# Patient Record
Sex: FEMALE | Race: WHITE | NOT HISPANIC OR LATINO | Employment: FULL TIME | URBAN - METROPOLITAN AREA
[De-identification: names, ages, dates, MRNs, and addresses within clinical notes are randomized per-mention and may not be internally consistent; named-entity substitution may affect disease eponyms.]

---

## 2019-03-27 PROBLEM — I10 ESSENTIAL HYPERTENSION: Status: ACTIVE | Noted: 2019-03-27

## 2019-06-24 ENCOUNTER — TELEPHONE (OUTPATIENT)
Dept: NEUROLOGY | Facility: CLINIC | Age: 59
End: 2019-06-24

## 2019-06-24 ENCOUNTER — DOCUMENTATION ONLY (OUTPATIENT)
Dept: NEUROLOGY | Facility: CLINIC | Age: 59
End: 2019-06-24

## 2019-06-24 NOTE — TELEPHONE ENCOUNTER
----- Message from RT Jet sent at 6/24/2019  9:03 AM CDT -----  Contact: pt    pt , returned missed call, called azalea crooks.

## 2019-08-23 ENCOUNTER — TELEPHONE (OUTPATIENT)
Dept: NEUROLOGY | Facility: CLINIC | Age: 59
End: 2019-08-23

## 2019-08-23 NOTE — TELEPHONE ENCOUNTER
----- Message from Bianca Castanon sent at 8/23/2019 10:59 AM CDT -----  Type: Needs Medical Advice    Who Called:  self  Symptoms (please be specific):  Referral for new patient   How long has patient had these symptoms:  Numbness on right side   Pharmacy name and phone #:     Best Call Back Number: [phone] (home)     Additional Information: no appointments available

## 2019-10-14 PROBLEM — R07.2 PRECORDIAL PAIN: Status: ACTIVE | Noted: 2019-10-14

## 2019-10-23 ENCOUNTER — OFFICE VISIT (OUTPATIENT)
Dept: NEUROLOGY | Facility: CLINIC | Age: 59
End: 2019-10-23
Payer: COMMERCIAL

## 2019-10-23 VITALS
WEIGHT: 213.38 LBS | HEART RATE: 81 BPM | HEIGHT: 65 IN | DIASTOLIC BLOOD PRESSURE: 85 MMHG | BODY MASS INDEX: 35.55 KG/M2 | RESPIRATION RATE: 16 BRPM | SYSTOLIC BLOOD PRESSURE: 135 MMHG

## 2019-10-23 DIAGNOSIS — Z86.69 HISTORY OF MIGRAINE: ICD-10-CM

## 2019-10-23 DIAGNOSIS — M54.6 THORACIC SPINE PAIN: ICD-10-CM

## 2019-10-23 DIAGNOSIS — M54.2 NECK PAIN: ICD-10-CM

## 2019-10-23 DIAGNOSIS — R20.0 RIGHT SIDED NUMBNESS: Primary | ICD-10-CM

## 2019-10-23 PROCEDURE — 3008F PR BODY MASS INDEX (BMI) DOCUMENTED: ICD-10-PCS | Mod: CPTII,S$GLB,, | Performed by: PSYCHIATRY & NEUROLOGY

## 2019-10-23 PROCEDURE — 3079F DIAST BP 80-89 MM HG: CPT | Mod: CPTII,S$GLB,, | Performed by: PSYCHIATRY & NEUROLOGY

## 2019-10-23 PROCEDURE — 99205 PR OFFICE/OUTPT VISIT, NEW, LEVL V, 60-74 MIN: ICD-10-PCS | Mod: S$GLB,,, | Performed by: PSYCHIATRY & NEUROLOGY

## 2019-10-23 PROCEDURE — 3075F PR MOST RECENT SYSTOLIC BLOOD PRESS GE 130-139MM HG: ICD-10-PCS | Mod: CPTII,S$GLB,, | Performed by: PSYCHIATRY & NEUROLOGY

## 2019-10-23 PROCEDURE — 99205 OFFICE O/P NEW HI 60 MIN: CPT | Mod: S$GLB,,, | Performed by: PSYCHIATRY & NEUROLOGY

## 2019-10-23 PROCEDURE — 99999 PR PBB SHADOW E&M-EST. PATIENT-LVL IV: ICD-10-PCS | Mod: PBBFAC,,, | Performed by: PSYCHIATRY & NEUROLOGY

## 2019-10-23 PROCEDURE — 3075F SYST BP GE 130 - 139MM HG: CPT | Mod: CPTII,S$GLB,, | Performed by: PSYCHIATRY & NEUROLOGY

## 2019-10-23 PROCEDURE — 3079F PR MOST RECENT DIASTOLIC BLOOD PRESSURE 80-89 MM HG: ICD-10-PCS | Mod: CPTII,S$GLB,, | Performed by: PSYCHIATRY & NEUROLOGY

## 2019-10-23 PROCEDURE — 99999 PR PBB SHADOW E&M-EST. PATIENT-LVL IV: CPT | Mod: PBBFAC,,, | Performed by: PSYCHIATRY & NEUROLOGY

## 2019-10-23 PROCEDURE — 3008F BODY MASS INDEX DOCD: CPT | Mod: CPTII,S$GLB,, | Performed by: PSYCHIATRY & NEUROLOGY

## 2019-10-23 RX ORDER — LANOLIN ALCOHOL/MO/W.PET/CERES
400 CREAM (GRAM) TOPICAL DAILY
Refills: 0 | COMMUNITY
Start: 2019-10-23 | End: 2023-08-16 | Stop reason: CLARIF

## 2019-10-23 NOTE — PROGRESS NOTES
Date: 10/24/2019    Patient ID: Laura Aquino is a 59 y.o. female.    Referring Provider:  Gabe Nicole*    Chief Complaint: Peripheral Neuropathy      History of Present Illness:  Ms. Aquino is a 59 y.o. female who presents referred by Gabe Nicole* today for evaluation of numbness/paresthesias. Records from [place] reviewed and summarized below.     She had an episode in [place] in March 2019. She woke up lightheaded and with a headache. She had numbness in her lips, right arm numbness, and bilateral UE. She felt her heart was racing and pounding out of her chest. She had MRI, CT, and EKG which were normal. She was better by the next day.     She presented to the [facility] ER on 6/10/2019 for dizziness, palpitations, paresthesias in her face. She does not recall having a headache with this one. Bloodwork and EKG were normal. She refused CT head and left AMA. She saw her PCP who referred her to neurology. It was noted the pattern was not consistent with TIA/stroke but she was started on aspirin. Vitamin levels were checked and B6 was high.     The right face has remained numb since that time but it comes and goes. She sometimes gets right eye vision changes where she sees flurries in her right eye. These are similar to her prior migraine aura but not as intense. This morning she saw a red patch in her right visual field. She has rare migraines. Normally she doesn't get headaches. She will have one once every 3-4 years. She always get an aura beforehand where she couldn't see out of her right visual field.     She hasn't yet seen an optho doctor.     She has numbness in her feet constantly and feels this is separate.     Her A1c was 6.2%. She was started on BP meds and statin. She is on aspirin.     She went to the ER 10/8 and had chest heaviness. She was given nitroglycerin and had relief. She was evaluated by cardiology who did a stress test and found a moderate defect in the anterior wall and  heart cath was performed. She was told the cath was fine. Since the procedure, she has not had any more episodes of chest heaviness.     On Sunday, she woke up with an excruciating pain in her right shoulder blade and was severe. She took ibuprofen and it has gotten better over the course of the past few days. She has some stiffness in the neck as well.     Review of Systems:   14 systems reviewed - All other systems were reviewed and negative except as mentioned in the HPI    Allergies:  Review of patient's allergies indicates:  No Known Allergies    Current Medications:  Current Outpatient Medications   Medication Sig Dispense Refill    amLODIPine (NORVASC) 5 MG tablet Take 1 tablet (5 mg total) by mouth once daily. 90 tablet 4    atorvastatin (LIPITOR) 40 MG tablet Take 40 mg by mouth once daily.      metFORMIN (GLUCOPHAGE-XR) 500 MG 24 hr tablet Take 1 tablet (500 mg total) by mouth daily with breakfast. 90 tablet 3    nitroGLYCERIN (NITROSTAT) 0.4 MG SL tablet Place 1 tablet (0.4 mg total) under the tongue every 5 (five) minutes as needed for Chest pain. 100 tablet 0    aspirin (ECOTRIN) 81 MG EC tablet Take 1 tablet (81 mg total) by mouth once daily. (Patient not taking: Reported on 10/23/2019)  0    magnesium oxide (MAG-OX) 400 mg (241.3 mg magnesium) tablet Take 1 tablet (400 mg total) by mouth once daily.  0     No current facility-administered medications for this visit.        Past Medical History:  Past Medical History:   Diagnosis Date    Abdominal wall mass     Abnormal results of liver function studies     AD (atopic dermatitis)     Diabetes mellitus     Hyperlipidemia     Hypertension     Precordial pain 10/2019    Skin sensation disturbance        Past Surgical History:  Past Surgical History:   Procedure Laterality Date    ANGIOGRAM, CORONARY, WITH LEFT HEART CATHETERIZATION N/A 10/14/2019    Procedure: Angiogram, Coronary, with Left Heart Cath;  Surgeon: Franki Carroll MD;   "Location: [facility] CATH;  Service: Cardiology;  Laterality: N/A;    HYSTERECTOMY  1999    Complete    LEFT HEART CATHETERIZATION Left 10/14/2019    Procedure: Left heart cath;  Surgeon: Franki Carroll MD;  Location: [facility] CATH;  Service: Cardiology;  Laterality: Left;    LIPOMA RESECTION      TOTAL ABDOMINAL HYSTERECTOMY W/ BILATERAL SALPINGOOPHORECTOMY Bilateral 1999    w/ hysterectomy    TUBAL LIGATION         Family History:  family history includes Anxiety disorder in her brother and sister; Asthma in her mother; Diabetes in her father and mother; HIV in her son; No Known Problems in her son and son; Sarcoidosis in her daughter.    Social History:   reports that she quit smoking about 26 years ago. Her smoking use included cigarettes. She has a 5.00 pack-year smoking history. She has never used smokeless tobacco. She reports that she does not drink alcohol or use drugs.    Physical Exam:  Vitals:    10/23/19 1414   BP: 135/85   Pulse: 81   Resp: 16   Weight: 96.8 kg (213 lb 6.5 oz)   Height: 5' 5" (1.651 m)   PainSc: 0-No pain     Body mass index is 35.51 kg/m².  General: Well developed, well nourished.  No acute distress.  Eyes: no ocular hemorrhages  Musculoskeletal: No obvious joint deformities, moves all extremities well.  Peripheral vascular: No edema noted    Neurological Exam:  Mental status: Awake, alert, and oriented to person, place, and time. Recent and remote memory appear to be intact. Attention, concentration, and fund of knowledge regarding recent events normal.   Speech/Language: No dysarthria or aphasia on conversation.   Cranial nerves: Visual fields full. Pupils equal round and reactive to light, extraocular movements intact, facial strength and sensation intact bilaterally, palate and tongue midline, hearing grossly intact bilaterally. Shoulder shrug normal bilaterally.   Motor: 5 out of 5 strength throughout the upper and lower extremities bilaterally. Normal bulk and tone.   Sensation: " Intact to light touch, pinprick, and vibration bilaterally.  DTR: 2+ at the knees and biceps bilaterally.  Coordination: Finger-nose-finger testing and rapid alternating movements normal bilaterally. No tremor.   Gait: Normal gait        Data:  I have personally reviewed the referring provider's notes, labs, & imaging made available to me today.       Labs:  CBC:   Lab Results   Component Value Date    WBC 7.44 10/08/2019    HGB 13.3 10/08/2019    HCT 39.6 10/08/2019     10/08/2019    MCV 84 10/08/2019    RDW 13.6 10/08/2019     BMP:   Lab Results   Component Value Date     10/08/2019    K 4.3 10/08/2019     10/08/2019    CO2 27 10/08/2019    BUN 14 10/08/2019    CREATININE 0.92 10/08/2019    GLU 93 10/08/2019    CALCIUM 9.5 10/08/2019    MG 1.8 06/11/2019     LFTS;   Lab Results   Component Value Date    PROT 7.7 10/08/2019    ALBUMIN 4.3 10/08/2019    BILITOT 0.5 10/08/2019    AST 41 (H) 10/08/2019    ALKPHOS 77 10/08/2019    ALT 26 10/08/2019     COAGS: No results found for: INR, PROTIME, PTT  FLP: No results found for: CHOL, HDL, LDLCALC, TRIG, CHOLHDL      Imaging:  MRI brain and CTA from march 2019 were reportedly normal.   I have personally reviewed CT head from 10/8/2019 and find it to be normal.     Assessment and Plan:  Ms. Aquino is a 59 y.o. female referred to me by Gabe Nicole for evaluation of right sided numbness. We discussed possibilities of small lacunar stroke that didn't show up on initial MRI, complex migraine aura, cervical spine abnormalities, or thoracic spine abnormalities for her right shoulder blade pain. I will obtain MRI brain, MRI c and t spine. I recommended that she continue on aspirin 81 mg daily in the event this was stroke/TIA. We will also start her on magnesium for complex migraine aura prophylaxis.       Right sided numbness  -     MRI Cervical Spine Without Contrast; Future; Expected date: 10/23/2019  -     MRI Brain Without Contrast; Future;  Expected date: 10/23/2019    Thoracic spine pain  -     MRI Cervical Spine Without Contrast; Future; Expected date: 10/23/2019  -     MRI Thoracic Spine Without Contrast; Future; Expected date: 10/23/2019  -     MRI Brain Without Contrast; Future; Expected date: 10/23/2019    History of migraine  -     MRI Cervical Spine Without Contrast; Future; Expected date: 10/23/2019  -     MRI Brain Without Contrast; Future; Expected date: 10/23/2019    Neck pain  -     MRI Cervical Spine Without Contrast; Future; Expected date: 10/23/2019  -     MRI Brain Without Contrast; Future; Expected date: 10/23/2019    Other orders  -     magnesium oxide (MAG-OX) 400 mg (241.3 mg magnesium) tablet; Take 1 tablet (400 mg total) by mouth once daily.; Refill: 0

## 2019-10-23 NOTE — LETTER
October 24, 2019      Gabe Nicole, DO  [address] [place] [address] [id number] [place], [place] [address] [place] [address]  Phone: [phone]  Fax: [phone]          Patient: Laura Aquino   MR Number: [id number]   YOB: 1960   Date of Visit: 10/23/2019       Dear Dr. Gabe Nicole:    Thank you for referring Laura Aquino to me for evaluation. Attached you will find relevant portions of my assessment and plan of care.    If you have questions, please do not hesitate to call me. I look forward to following Laura Aquino along with you.    Sincerely,    Rachele Castanon MD    Enclosure  CC:  No Recipients    If you would like to receive this communication electronically, please contact [email or web address] or [phone] to request more information on [employer] Link access.    For providers and/or their staff who would like to refer a patient to [facility], please contact us through our one-stop-shop provider referral line, [facility], at [phone].    If you feel you have received this communication in error or would no longer like to receive these types of communications, please e-mail [email or web address]

## 2019-11-12 ENCOUNTER — HOSPITAL ENCOUNTER (OUTPATIENT)
Dept: RADIOLOGY | Facility: HOSPITAL | Age: 59
Discharge: HOME | End: 2019-11-12
Attending: PSYCHIATRY & NEUROLOGY
Payer: COMMERCIAL

## 2019-11-12 DIAGNOSIS — M54.2 NECK PAIN: ICD-10-CM

## 2019-11-12 DIAGNOSIS — R20.0 RIGHT SIDED NUMBNESS: ICD-10-CM

## 2019-11-12 DIAGNOSIS — Z86.69 HISTORY OF MIGRAINE: ICD-10-CM

## 2019-11-12 DIAGNOSIS — M54.6 THORACIC SPINE PAIN: ICD-10-CM

## 2019-11-12 PROCEDURE — 72141 MRI NECK SPINE W/O DYE: CPT | Mod: 26,,, | Performed by: RADIOLOGY

## 2019-11-12 PROCEDURE — 72141 MRI CERVICAL SPINE WITHOUT CONTRAST: ICD-10-PCS | Mod: 26,,, | Performed by: RADIOLOGY

## 2019-11-12 PROCEDURE — 72146 MRI CHEST SPINE W/O DYE: CPT | Mod: TC,PO

## 2019-11-12 PROCEDURE — 72146 MRI THORACIC SPINE WITHOUT CONTRAST: ICD-10-PCS | Mod: 26,,, | Performed by: RADIOLOGY

## 2019-11-12 PROCEDURE — 70551 MRI BRAIN WITHOUT CONTRAST: ICD-10-PCS | Mod: 26,,, | Performed by: RADIOLOGY

## 2019-11-12 PROCEDURE — 72146 MRI CHEST SPINE W/O DYE: CPT | Mod: 26,,, | Performed by: RADIOLOGY

## 2019-11-12 PROCEDURE — 70551 MRI BRAIN STEM W/O DYE: CPT | Mod: 26,,, | Performed by: RADIOLOGY

## 2019-11-12 PROCEDURE — 70551 MRI BRAIN STEM W/O DYE: CPT | Mod: TC,PO

## 2019-11-12 PROCEDURE — 72141 MRI NECK SPINE W/O DYE: CPT | Mod: TC,PO

## 2019-11-13 ENCOUNTER — TELEPHONE (OUTPATIENT)
Dept: NEUROLOGY | Facility: CLINIC | Age: 59
End: 2019-11-13

## 2019-11-13 NOTE — TELEPHONE ENCOUNTER
----- Message from Rachele Castanon MD sent at 11/13/2019  8:36 AM CST -----  MRI of the brain showed age related changes and no stroke or cause for the right face and arm numbness. I still could continue on the aspirin and magnesium.

## 2019-11-13 NOTE — TELEPHONE ENCOUNTER
Spoke with pt and informed per Dr. Castanon that the MRI of the brain showed age related changes and no stroke or cause for the right face and arm numbness. Could continue on the aspirin and magnesium.   There is some arthritis in the thoracic spine that could lead to pinching of nerves and the pain in the upper back. This sometimes will improve spontaneously over the course of 6-8 weeks but if it is persistent, we can order physical therapy and send to pain management. Instructed to call and inform if PT pr pain management is necessary.   The MRI spine showed there is some disc bulges and arthritis changes in the neck as well that could lead to neck pain and possible numbness in the arm but this wouldn't explain the numbness in the face. Pt verbalized understanding.

## 2020-09-12 ENCOUNTER — CLINICAL SUPPORT (OUTPATIENT)
Dept: URGENT CARE | Facility: CLINIC | Age: 60
End: 2020-09-12
Payer: COMMERCIAL

## 2020-09-12 DIAGNOSIS — Z01.818 PRE-OP EXAM: ICD-10-CM

## 2020-09-12 PROCEDURE — U0003 INFECTIOUS AGENT DETECTION BY NUCLEIC ACID (DNA OR RNA); SEVERE ACUTE RESPIRATORY SYNDROME CORONAVIRUS 2 (SARS-COV-2) (CORONAVIRUS DISEASE [COVID-19]), AMPLIFIED PROBE TECHNIQUE, MAKING USE OF HIGH THROUGHPUT TECHNOLOGIES AS DESCRIBED BY CMS-2020-01-R: HCPCS

## 2020-09-13 LAB — SARS-COV-2 RNA RESP QL NAA+PROBE: NOT DETECTED

## 2020-09-16 PROBLEM — G47.33 OBSTRUCTIVE SLEEP APNEA: Status: ACTIVE | Noted: 2020-09-16

## 2020-09-16 PROBLEM — R07.0 THROAT PAIN: Status: ACTIVE | Noted: 2020-09-16

## 2020-09-16 PROBLEM — J35.01 CHRONIC TONSILLITIS: Status: ACTIVE | Noted: 2020-09-16

## 2020-09-16 PROBLEM — R06.83 SNORING: Status: ACTIVE | Noted: 2020-09-16

## 2020-10-26 ENCOUNTER — TELEPHONE (OUTPATIENT)
Dept: GASTROENTEROLOGY | Facility: CLINIC | Age: 60
End: 2020-10-26

## 2020-10-26 ENCOUNTER — OFFICE VISIT (OUTPATIENT)
Dept: GASTROENTEROLOGY | Facility: CLINIC | Age: 60
End: 2020-10-26
Payer: COMMERCIAL

## 2020-10-26 VITALS — BODY MASS INDEX: 28.5 KG/M2 | RESPIRATION RATE: 18 BRPM | WEIGHT: 171.06 LBS | HEIGHT: 65 IN

## 2020-10-26 DIAGNOSIS — R09.A2 GLOBUS SENSATION: ICD-10-CM

## 2020-10-26 DIAGNOSIS — Z90.89 S/P TONSILLECTOMY: ICD-10-CM

## 2020-10-26 DIAGNOSIS — Z01.818 PREOP TESTING: ICD-10-CM

## 2020-10-26 DIAGNOSIS — J02.9 SORE THROAT: ICD-10-CM

## 2020-10-26 DIAGNOSIS — R05.9 COUGH: ICD-10-CM

## 2020-10-26 DIAGNOSIS — Z01.812 PRE-PROCEDURE LAB EXAM: ICD-10-CM

## 2020-10-26 DIAGNOSIS — K59.09 CHRONIC CONSTIPATION: ICD-10-CM

## 2020-10-26 DIAGNOSIS — R10.9 RIGHT SIDED ABDOMINAL PAIN: Primary | ICD-10-CM

## 2020-10-26 DIAGNOSIS — R12 WATERBRASH: ICD-10-CM

## 2020-10-26 PROCEDURE — 99999 PR PBB SHADOW E&M-EST. PATIENT-LVL IV: ICD-10-PCS | Mod: PBBFAC,,, | Performed by: NURSE PRACTITIONER

## 2020-10-26 PROCEDURE — 99204 OFFICE O/P NEW MOD 45 MIN: CPT | Mod: S$GLB,,, | Performed by: NURSE PRACTITIONER

## 2020-10-26 PROCEDURE — 99204 PR OFFICE/OUTPT VISIT, NEW, LEVL IV, 45-59 MIN: ICD-10-PCS | Mod: S$GLB,,, | Performed by: NURSE PRACTITIONER

## 2020-10-26 PROCEDURE — 3008F BODY MASS INDEX DOCD: CPT | Mod: CPTII,S$GLB,, | Performed by: NURSE PRACTITIONER

## 2020-10-26 PROCEDURE — 99999 PR PBB SHADOW E&M-EST. PATIENT-LVL IV: CPT | Mod: PBBFAC,,, | Performed by: NURSE PRACTITIONER

## 2020-10-26 PROCEDURE — 3008F PR BODY MASS INDEX (BMI) DOCUMENTED: ICD-10-PCS | Mod: CPTII,S$GLB,, | Performed by: NURSE PRACTITIONER

## 2020-10-26 RX ORDER — ALPRAZOLAM 1 MG/1
1 TABLET ORAL DAILY PRN
COMMUNITY
End: 2021-11-16 | Stop reason: SDUPTHER

## 2020-10-26 RX ORDER — FAMOTIDINE 40 MG/1
40 TABLET, FILM COATED ORAL DAILY
Qty: 30 TABLET | Refills: 2 | Status: ON HOLD | OUTPATIENT
Start: 2020-10-26 | End: 2020-11-06 | Stop reason: SDUPTHER

## 2020-10-26 RX ORDER — AZELASTINE 1 MG/ML
SPRAY, METERED NASAL
COMMUNITY
Start: 2020-10-15 | End: 2023-07-26

## 2020-10-26 NOTE — PROGRESS NOTES
Subjective:       Patient ID: Laura Aquino is a 60 y.o. female Body mass index is 28.47 kg/m².    Chief Complaint: GI Problem (Right-sided Abdominal Pain, Constipation, globus sensation)    This patient is new to me.     GI Problem  The primary symptoms include weight loss (lost ~39 lbs since 10/2019 with dieting) and abdominal pain. Primary symptoms do not include fever, fatigue, nausea, vomiting, diarrhea, melena, hematemesis, hematochezia or dysuria. Primary symptoms comment: CHIEF COMPLAINT: GLOBUS SENSATION- lump in throat sensation.   The abdominal pain began more than 2 days ago (started after her tonsillectomy on 9/16/2020). Pain location: described as constant, pressure to mid right abdomen. The severity of the abdominal pain is 2/10 (currently). The abdominal pain is relieved by bowel movements (worse when constipated).   The illness is also significant for constipation (chronic for several years; worsened since her tonsillectomy, bowel movements are once daily of small amount of stool; PAST TREATMENT: metamucil- no relief, miralax x3 days- no relief, smooth move tea- mild relief, coconut oil helped) and tenesmus. The illness does not include chills, dysphagia or odynophagia. Significant associated medical issues include GERD (occasional waterbrash, bad aftertaste in her mouth after she eats something; ENT prescribed protonix, she took it for a few months but stopped because it worsened her constipation and it didn't help globus sensation).     Review of Systems   Constitutional: Positive for weight loss (lost ~39 lbs since 10/2019 with dieting). Negative for appetite change, chills, fatigue and fever.   HENT: Positive for sore throat (since tonsillectomy, has not changed). Negative for trouble swallowing and voice change.    Respiratory: Positive for cough (nonproductive intermittent (not daily)). Negative for choking and shortness of breath.    Cardiovascular: Negative for chest pain.    Gastrointestinal: Positive for abdominal pain and constipation (chronic for several years; worsened since her tonsillectomy, bowel movements are once daily of small amount of stool; PAST TREATMENT: metamucil- no relief, miralax x3 days- no relief, smooth move tea- mild relief, coconut oil helped). Negative for anal bleeding, blood in stool, diarrhea, dysphagia, hematemesis, hematochezia, melena, nausea, rectal pain and vomiting.   Genitourinary: Negative for difficulty urinating, dysuria and flank pain.   Neurological: Negative for weakness.       No LMP recorded. Patient has had a hysterectomy.  Past Medical History:   Diagnosis Date    Abdominal wall mass     Abnormal results of liver function studies     AD (atopic dermatitis)     Diabetes mellitus     Diverticulosis     Hepatic steatosis     Hyperlipidemia     Hypertension     Precordial pain 10/2019    Skin sensation disturbance      Past Surgical History:   Procedure Laterality Date    ANGIOGRAM, CORONARY, WITH LEFT HEART CATHETERIZATION N/A 10/14/2019    Procedure: Angiogram, Coronary, with Left Heart Cath;  Surgeon: Franki Carroll MD;  Location: [facility] CATH;  Service: Cardiology;  Laterality: N/A;    COLONOSCOPY  08/12/2020    Dr. Lopez, in procedures: diverticulosis, fair prep; repeat in 5 years for screening    HYSTERECTOMY  1999    Complete    LEFT HEART CATHETERIZATION Left 10/14/2019    Procedure: Left heart cath;  Surgeon: Franki Carroll MD;  Location: [facility] CATH;  Service: Cardiology;  Laterality: Left;    LIPOMA RESECTION      TONSILLECTOMY Bilateral 9/16/2020    Procedure: TONSILLECTOMY;  Surgeon: Pk Kirkpatrick MD;  Location: [facility];  Service: ENT;  Laterality: Bilateral;    TOTAL ABDOMINAL HYSTERECTOMY W/ BILATERAL SALPINGOOPHORECTOMY Bilateral 1999    w/ hysterectomy    TUBAL LIGATION       Family History   Problem Relation Age of Onset    Asthma Mother     Diabetes Mother     Anxiety disorder Sister     Anxiety  disorder Brother     Diabetes Father     Sarcoidosis Daughter     HIV Son     No Known Problems Son     No Known Problems Son     Colon cancer Neg Hx     Colon polyps Neg Hx     Crohn's disease Neg Hx     Ulcerative colitis Neg Hx     Stomach cancer Neg Hx     Esophageal cancer Neg Hx      Wt Readings from Last 10 Encounters:   10/26/20 77.6 kg (171 lb 1.2 oz)   09/16/20 81.3 kg (179 lb 3.7 oz)   08/25/20 83.9 kg (185 lb)   07/06/20 83.9 kg (185 lb)   03/16/20 94.6 kg (208 lb 8 oz)   12/03/19 95.5 kg (210 lb 8 oz)   10/23/19 96.8 kg (213 lb 6.5 oz)   10/14/19 95.3 kg (210 lb)   10/08/19 94.5 kg (208 lb 5.4 oz)   09/25/19 96.2 kg (212 lb)     Lab Results   Component Value Date    WBC 6.89 04/23/2020    HGB 14.2 09/16/2020    HCT 43.7 04/23/2020    MCV 84 04/23/2020     04/23/2020     CMP  Sodium   Date Value Ref Range Status   09/16/2020 137 136 - 145 mmol/L Final     Potassium   Date Value Ref Range Status   09/16/2020 3.9 3.5 - 5.1 mmol/L Final     Chloride   Date Value Ref Range Status   09/16/2020 105 95 - 110 mmol/L Final     CO2   Date Value Ref Range Status   09/16/2020 27 22 - 31 mmol/L Final     Glucose   Date Value Ref Range Status   09/16/2020 97 70 - 110 mg/dL Final     Comment:     The ADA recommends the following guidelines for fasting glucose:  Normal:       less than 100 mg/dL  Prediabetes:  100 mg/dL to 125 mg/dL  Diabetes:     126 mg/dL or higher       BUN, Bld   Date Value Ref Range Status   09/16/2020 16 7 - 18 mg/dL Final     Creatinine   Date Value Ref Range Status   09/16/2020 0.93 0.50 - 1.40 mg/dL Final     Calcium   Date Value Ref Range Status   09/16/2020 9.9 8.4 - 10.2 mg/dL Final     Total Protein   Date Value Ref Range Status   10/08/2019 7.7 6.0 - 8.4 g/dL Final     Albumin   Date Value Ref Range Status   10/08/2019 4.3 3.5 - 5.2 g/dL Final     Total Bilirubin   Date Value Ref Range Status   10/08/2019 0.5 0.2 - 1.3 mg/dL Final     Alkaline Phosphatase   Date Value Ref  Range Status   10/08/2019 77 38 - 145 U/L Final     AST   Date Value Ref Range Status   10/08/2019 41 (H) 14 - 36 U/L Final     ALT   Date Value Ref Range Status   10/08/2019 26 10 - 44 U/L Final     Anion Gap   Date Value Ref Range Status   09/16/2020 5 (L) 8 - 16 mmol/L Final     eGFR if    Date Value Ref Range Status   09/16/2020 >60 >60 mL/min/1.73 m^2 Final     eGFR if non    Date Value Ref Range Status   09/16/2020 >60 >60 mL/min/1.73 m^2 Final     Comment:     Calculation used to obtain the estimated glomerular filtration  rate (eGFR) is the CKD-EPI equation.        Lab Results   Component Value Date    TSH 1.210 04/29/2020     Reviewed prior medical records including radiology report of 6/9/2020 CT neck; 4/29/2020 CT chest; 5/7/2019 limited abdominal ultrasound; 11/30/2016 CT abdomen pelvis; & endoscopy history (see surgical history).    Objective:      Physical Exam  Vitals signs and nursing note reviewed.   Constitutional:       General: She is not in acute distress.     Appearance: Normal appearance. She is well-developed. She is not diaphoretic.   HENT:      Mouth/Throat:      Comments: Patient is wearing a face mask, which covers patient's mouth and nose, due to COVID 19 concerns.  Eyes:      General: No scleral icterus.     Conjunctiva/sclera: Conjunctivae normal.      Pupils: Pupils are equal, round, and reactive to light.   Pulmonary:      Effort: Pulmonary effort is normal. No respiratory distress.      Breath sounds: Normal breath sounds. No wheezing.   Abdominal:      General: Bowel sounds are normal. There is no distension or abdominal bruit.      Palpations: Abdomen is soft. Abdomen is not rigid. There is no mass.      Tenderness: There is no abdominal tenderness. There is no guarding or rebound. Negative signs include Cameron's sign and McBurney's sign.   Skin:     General: Skin is warm and dry.      Coloration: Skin is not pale.      Findings: No erythema or  rash.      Comments: Non-jaundiced   Neurological:      Mental Status: She is alert and oriented to person, place, and time.   Psychiatric:         Behavior: Behavior normal.         Thought Content: Thought content normal.         Judgment: Judgment normal.         Assessment:       1. Right sided abdominal pain    2. Chronic constipation    3. Globus sensation    4. Waterbrash    5. Sore throat    6. S/P tonsillectomy    7. Cough        Plan:       Right sided abdominal pain  -     Comprehensive Metabolic Panel; Future; Expected date: 10/26/2020  -     US Abdomen Complete; Future; Expected date: 10/26/2020  -  START   famotidine (PEPCID) 40 MG tablet; Take 1 tablet (40 mg total) by mouth once daily.  Dispense: 30 tablet; Refill: 2  -     CBC Without Differential; Future; Expected date: 10/26/2020  -     Lipase; Future; Expected date: 10/26/2020  - schedule EGD, discussed procedure with patient, including risks and benefits, patient verbalized understanding  - Possible CT SCAN pending results of testing and if symptoms persist    Chronic constipation  -  START   linaCLOtide (LINZESS) 145 mcg Cap capsule; Take 1 capsule (145 mcg total) by mouth once daily. Take >30 minutes before 1st meal of the day.  Dispense: 30 capsule; Refill: 2  Recommend daily exercise as tolerated, adequate water intake (six 8-oz glasses of water daily), and high fiber diet. OTC fiber supplements are recommended if diet does not reach daily fiber goal (20-30 grams daily), such as Metamucil, Citrucel, or FiberCon (take as directed, separate from other oral medications by >2 hours).  -If still no improvement with these measures, call/follow-up    Globus sensation  -  START   famotidine (PEPCID) 40 MG tablet; Take 1 tablet (40 mg total) by mouth once daily.  Dispense: 30 tablet; Refill: 2  - schedule EGD, discussed procedure with patient, including risks and benefits, patient verbalized understanding  Recommend follow-up with ENT for continued  evaluation and management.    Waterbrash  -  START   famotidine (PEPCID) 40 MG tablet; Take 1 tablet (40 mg total) by mouth once daily.  Dispense: 30 tablet; Refill: 2  - schedule EGD, discussed procedure with patient, including risks and benefits, patient verbalized understanding    Sore throat & S/P tonsillectomy  Recommend follow-up with ENT for continued evaluation and management.    Cough  - schedule EGD, discussed procedure with patient, including risks and benefits, patient verbalized understanding  Recommend follow-up with Primary Care Provider for continued evaluation and management.    Follow up in about 1 month (around 11/26/2020), or if symptoms worsen or fail to improve.      If no improvement in symptoms or symptoms worsen, call/follow-up at clinic or go to ER.

## 2020-10-26 NOTE — PATIENT INSTRUCTIONS
Abdominal Pain    Abdominal pain is pain in the stomach or belly area. Everyone has this pain from time to time. In many cases it goes away on its own. But abdominal pain can sometimes be due to a serious problem, such as appendicitis. So its important to know when to seek help.  Causes of abdominal pain  There are many possible causes of abdominal pain. Common causes in adults include:  · Constipation, diarrhea, or gas  · Stomach acid flowing back up into the esophagus (acid reflux or heartburn)  · Severe acid reflux, called GERD (gastroesophageal reflux disease)  · A sore in the lining of the stomach or small intestine (peptic ulcer)  · Inflammation of the gallbladder, liver, or pancreas  · Gallstones or kidney stones  · Appendicitis   · Intestinal blockage   · An internal organ pushing through a muscle or other tissue (hernia)  · Urinary tract infections  · In women, menstrual cramps, fibroids, or endometriosis  · Inflammation or infection of the intestines  Diagnosing the cause of abdominal pain  Your healthcare provider will do a physical exam help find the cause of your pain. If needed, tests will be ordered. Belly pain has many possible causes. So it can be hard to find the reason for your pain. Giving details about your pain can help. Tell your provider where and when you feel the pain, and what makes it better or worse. Also let your provider know if you have other symptoms such as:  · Fever  · Tiredness  · Upset stomach (nausea)  · Vomiting  · Changes in bathroom habits  Treating abdominal pain  Some causes of pain need emergency medical treatment right away. These include appendicitis or a bowel blockage. Other problems can be treated with rest, fluids, or medicines. Your healthcare provider can give you specific instructions for treatment or self-care based on what is causing your pain.  If you have vomiting or diarrhea, sip water or other clear fluids. When you are ready to eat solid foods again,  start with small amounts of easy-to-digest, low-fat foods. These include apple sauce, toast, or crackers.   When to seek medical care  Call 911 or go to the hospital right away if you:  · Cant pass stool and are vomiting  · Are vomiting blood or have bloody diarrhea or black, tarry diarrhea  · Have chest, neck, or shoulder pain  · Feel like you might pass out  · Have pain in your shoulder blades with nausea  · Have sudden, severe belly pain  · Have new, severe pain unlike any you have felt before  · Have a belly that is rigid, hard, and tender to touch  Call your healthcare provider if you have:  · Pain for more than 5 days  · Bloating for more than 2 days  · Diarrhea for more than 5 days  · A fever of 100.4°F (38.0°C) or higher, or as directed by your provider  · Pain that gets worse  · Weight loss for no reason  · Continued lack of appetite  · Blood in your stool  How to prevent abdominal pain  Here are some tips to help prevent abdominal pain:  · Eat smaller amounts of food at one time.  · Avoid greasy, fried, or other high-fat foods.  · Avoid foods that give you gas.  · Exercise regularly.  · Drink plenty of fluids.  To help prevent GERD symptoms:  · Quit smoking.  · Reduce alcohol and certain foods that increase stomach acid.  · Avoid aspirin and over-the-counter pain and fever medicines (NSAIDS or nonsteroidal anti-inflammatory drugs), if possible  · Lose extra weight.  · Finish eating at least 2 hours before you go to bed or lie down.  · Raise the head of your bed.  Date Last Reviewed: 7/1/2016  © 0946-7838 [employer]. [address] [place] [address], [place] [address]. All rights reserved. This information is not intended as a substitute for professional medical care. Always follow your healthcare professional's instructions.          Constipation (Adult)  Constipation means that you have bowel movements that are less frequent than usual. Stools often become very hard and difficult to  pass.  Constipation is very common. At some point in life it affects almost everyone. Since everyone's bowel habits are different, what is constipation to one person may not be to another. Your healthcare provider may do tests to diagnose constipation. It depends on what he or she finds when evaluating you.    Symptoms of constipation include:  · Abdominal pain  · Bloating  · Vomiting  · Painful bowel movements  · Itching, swelling, bleeding, or pain around the anus  Causes  Constipation can have many causes. These include:  · Diet low in fiber  · Too much dairy  · Not drinking enough liquids  · Lack of exercise or physical activity. This is especially true for older adults.  · Changes in lifestyle or daily routine, including pregnancy, aging, work, and travel  · Frequent use or misuse of laxatives  · Ignoring the urge to have a bowel movement or delaying it until later  · Medicines, such as certain prescription pain medicines, iron supplements, antacids, certain antidepressants, and calcium supplements  · Diseases like irritable bowel syndrome, bowel obstructions, stroke, diabetes, thyroid disease, Parkinson disease, hemorrhoids, and colon cancer  Complications  Potential complications of constipation can include:  · Hemorrhoids  · Rectal bleeding from hemorrhoids or anal fissures (skin tears)  · Hernias  · Dependency on laxatives  · Chronic constipation  · Fecal impaction  · Bowel obstruction or perforation  Home care  All treatment should be done after talking with your healthcare provider. This is especially true if you have another medical problems, are taking prescription medicines, or are an older adult. Treatment most often involves lifestyle changes. You may also need medicines. Your healthcare provider will tell you which will work best for you. Follow the advice below to help avoid this problem in the future.  Lifestyle changes  These lifestyle changes can help prevent constipation:  · Diet. Eat a  high-fiber diet, with fresh fruit and vegetables, and reduce dairy intake, meats, and processed foods  · Fluids. It's important to get enough fluids each day. Drink plenty of water when you eat more fiber. If you are on diet that limits the amount of fluid you can have, talk about this with your healthcare provider.  · Regular exercise. Check with your healthcare provider first.  Medications  Take any medicines as directed. Some laxatives are safe to use only every now and then. Others can be taken on a regular basis. Talk with your doctor or pharmacist if you have questions.  Prescription pain medicines can cause constipation. If you are taking this kind of medicine, ask your healthcare provider if you should also take a stool softener.  Medicines you may take to treat constipation include:  · Fiber supplements  · Stool softeners  · Laxatives  · Enemas  · Rectal suppositories  Follow-up care  Follow up with your healthcare provider if symptoms don't get better in the next few days. You may need to have more tests or see a specialist.  Call 911  Call 911 if any of these occur:  · Trouble breathing  · Stiff, rigid abdomen that is severely painful to touch  · Confusion  · Fainting or loss of consciousness  · Rapid heart rate  · Chest pain  When to seek medical advice  Call your healthcare provider right away if any of these occur:  · Fever over 100.4°F (38°C)  · Failure to resume normal bowel movements  · Pain in your abdomen or back gets worse  · Nausea or vomiting  · Swelling in your abdomen  · Blood in the stool  · Black, tarry stool  · Involuntary weight loss  · Weakness  Date Last Reviewed: 12/30/2015  © 6776-8605 [employer]. [address] [place] [address], [place] [address]. All rights reserved. This information is not intended as a substitute for professional medical care. Always follow your healthcare professional's instructions.  Discharge Instructions: Eating a Soft Diet  You have been prescribed a  soft diet (also called gastrointestinal soft diet or bland diet). This reduces the amount of work your digestive tract has to do. It also reduces the chance that your digestive tract will be irritated by the food you eat. A soft diet is prescribed for people with digestive problems. The diet consists of foods that are tender, mildly seasoned, and easy to digest. While on this diet, you should not eat fried or spicy foods, or raw fruits and vegetables. Also avoid alcoholic beverages.  General guidelines  · Eat in a calm, relaxed atmosphere. How you eat may be as important as what you eat. Dont rush while eating. Chew your food slowly and thoroughly, and swallow slowly.  · Eat small frequent meals throughout the day, but dont eat within 2 hours of bedtime.  · Avoid any foods that cause discomfort.  · Dont use NSAIDs (nonsteroidal anti-inflammatory drugs), such as aspirin, and ibuprofen. Also avoid medicine that contain aspirin. NSAIDs can cause ulcers and delay or prevent ulcer healing.  · Use antacids as needed, but keep in mind that magnesium-containing antacids may cause diarrhea.  Foods to eat  · Cream of wheat and cream of rice  · Cooked white rice  · Mashed potatoes, and boiled potatoes without skin  · Plain pasta and noodles  · Plain white crackers (such as no-salt soda crackers)  · White bread  · Applesauce  · Cooked fruits without skins or seeds  · Mild juices, such as apple and grape  · Bananas  · Cooked or mashed vegetables without stems and seeds  ? Carrots  ? Summer squash (zucchini, yellow squash)  ? Winter squash (acorn, butternut, spaghetti squash)  · Cottage cheese  · Mild hard or soft cheeses  · Custard  · Yogurt without seeds or nuts  · Milk (you may need lactose-free milk)  · Ice cream without seeds or nuts  · Smooth peanut butter  · Eggs  · Fish, turkey, chicken, or other meat that is not tough or stringy  · Tofu  Foods to avoid  · Nuts and seeds  · Snack foods, such as the  following:  ? Chocolate-containing snacks, candy, pastries, or cakes.  ? Potato chips (plain, barbecued, or other flavors)  ? Taco chips or nachos  ? Corn chips  ? Popcorn, popcorn cakes, or rice cakes  ? Crackers with nuts, seeds, or spicy seasonings  ? French fries  · Fried or greasy foods  · Whole-grain breads, rolls, and crackers  · Breads and rolls with nuts, seeds, or bran  · Bran and granola cereals  · Berries with seeds, such as strawberries, raspberries, and blackberries  · Acidic fruits, such as oranges, grapefruits, harper, limes, and pineapples  · Raw vegetables  · Mild or hot peppers  · Sauerkraut and pickled vegetables  · Tomatoes or tomato products, such as tomato paste, tomato sauce, and tomato juice  · Barbecue sauce  · Spicy or flavored cheeses, such as jalapeño and black pepper cheese  · Crunchy peanut butter  · Dried cooked beans, such as seth, kidney, or navy beans  · The following meats:  ? Fried or greasy meats  ? Processed, spicy meats, such as sausage, so, ham, and lunch meats  ? Ribs and other meats with barbecue sauce  ? Tough or stringy meats, such as corned beef or beef jerky  Fluids to avoid  · Alcoholic beverages  · Coffee and regular teas  · [place]ha and other drinks with caffeine  · Cranberry, orange, pineapple, and grapefruit juice  · Lemonade  · Vegetable juice  · Whole milk, if you are lactose intolerant  Follow-up  Make a follow-up appointment with a dietitian as directed by our staff.  Date Last Reviewed: 6/21/2015  © 2062-3039 [employer]. [address] [place] [address], [place] [address]. All rights reserved. This information is not intended as a substitute for professional medical care. Always follow your healthcare professional's instructions.          GERD (Adult)    The esophagus is a tube that carries food from the mouth to the stomach. A valve at the lower end of the esophagus prevents stomach acid from flowing upward. When this valve doesn't work properly,  "stomach contents may repeatedly flow back up (reflux) into the esophagus. This is called gastroesophageal reflux disease (GERD). GERD can irritate the esophagus. It can cause problems with swallowing or breathing. In severe cases, GERD can cause recurrent pneumonia or other serious problems.  Symptoms of reflux include burning, pressure or sharp pain in the upper abdomen or mid to lower chest. The pain can spread to the neck, back, or shoulder. There may be belching, an acid taste in the back of the throat, chronic cough, or sore throat or hoarseness. GERD symptoms often occur during the day after a big meal. They can also occur at night when lying down.   Home care  Lifestyle changes can help reduce symptoms. If needed, medicines may be prescribed. Symptoms often improve with treatment, but if treatment is stopped, the symptoms often return after a few months. So most persons with GERD will need to continue treatment.  Lifestyle changes  · Limit or avoid fatty, fried, and spicy foods, as well as coffee, chocolate, mint, and foods with high acid content such as tomatoes and citrus fruit and juices (orange, grapefruit, lemon).  · Dont eat large meals, especially at night. Frequent, smaller meals are best. Do not lie down right after eating. And dont eat anything 3 hours before going to bed.  · Avoid drinking alcohol and smoking. As much as possible, stay away from second hand smoke.  · If you are overweight, losing weight will reduce symptoms.   · Avoid wearing tight clothing around your stomach area.  · If your symptoms occur during sleep, use a foam wedge to elevate your upper body (not just your head.) Or, place 4" blocks under the head of your bed.  Medicines  If needed, medicines can help relieve the symptoms of GERD and prevent damage to the esophagus. Discuss a medicine plan with your healthcare provider. This may include one or more of the following medicines:  · Antacids to help neutralize the normal acids " in your stomach.  · Acid blockers (H2 blockers) to decrease acid production.  · Acid inhibitors (PPIs) to decrease acid production in a different way than the blockers. They may work better, but can take a little longer to take effect.  Take an antacid 30-60 minutes after eating and at bedtime, but not at the same time as an acid blocker.  Try not to take medicines such as ibuprofen and aspirin. If you are taking aspirin for your heart or other medical reasons, talk to your healthcare provider about stopping it.  Follow-up care  Follow up with your healthcare provider or as advised by our staff.  When to seek medical advice  Call your healthcare provider if any of the following occur:  · Stomach pain gets worse or moves to the lower right abdomen (appendix area)  · Chest pain appears or gets worse, or spreads to the back, neck, shoulder, or arm  · Frequent vomiting (cant keep down liquids)  · Blood in the stool or vomit (red or black in color)  · Feeling weak or dizzy  · Fever of 100.4ºF (38ºC) or higher, or as directed by your healthcare provider  Date Last Reviewed: 6/23/2015  © 9775-6731 [employer]. [address] [place] [address], [place] [address]. All rights reserved. This information is not intended as a substitute for professional medical care. Always follow your healthcare professional's instructions.

## 2020-10-29 ENCOUNTER — TELEPHONE (OUTPATIENT)
Dept: GASTROENTEROLOGY | Facility: CLINIC | Age: 60
End: 2020-10-29

## 2020-10-29 ENCOUNTER — HOSPITAL ENCOUNTER (OUTPATIENT)
Dept: RADIOLOGY | Facility: HOSPITAL | Age: 60
Discharge: HOME | End: 2020-10-29
Attending: NURSE PRACTITIONER
Payer: COMMERCIAL

## 2020-10-29 DIAGNOSIS — R10.9 RIGHT SIDED ABDOMINAL PAIN: ICD-10-CM

## 2020-10-29 PROCEDURE — 76700 US ABDOMEN COMPLETE: ICD-10-PCS | Mod: 26,,, | Performed by: RADIOLOGY

## 2020-10-29 PROCEDURE — 76700 US EXAM ABDOM COMPLETE: CPT | Mod: TC,PO

## 2020-10-29 PROCEDURE — 76700 US EXAM ABDOM COMPLETE: CPT | Mod: 26,,, | Performed by: RADIOLOGY

## 2020-10-29 NOTE — TELEPHONE ENCOUNTER
"Please call to inform & review the results with the patient- radiology report of the abdominal ultrasound showed no acute findings. It showed "multiple simple hepatic cysts which are unchanged" since prior imaging study from 5/7/2019 and noted on CT imaging from 11/30/2016 as well. Liver cysts are typically benign.  Otherwise unremarkable findings.    Continue with previous recommendations. If no improvement in symptoms or symptoms worsen, call/follow-up at clinic or go to ER.  Please release results to patient's mychart once you have discussed results and recommendations with patient.  Thanks,        "

## 2020-10-30 ENCOUNTER — TELEPHONE (OUTPATIENT)
Dept: GASTROENTEROLOGY | Facility: CLINIC | Age: 60
End: 2020-10-30

## 2020-10-30 NOTE — TELEPHONE ENCOUNTER
----- Message from KITTY Jean sent at 10/30/2020  9:42 AM CDT -----  Please call to inform & review the results with the patient- lab results were unremarkable: blood counts showed no anemia, normal pancreatic enzyme, normal liver and kidney function levels and unremarkable electrolytes.  Continue with previous recommendations.  Thanks,  Elsy TANG-MEAGHAN

## 2020-10-30 NOTE — TELEPHONE ENCOUNTER
Spoke with pt and informed of results and recommendations per Reshma Mina NP. Pt verbalized understanding.

## 2020-11-03 ENCOUNTER — LAB VISIT (OUTPATIENT)
Dept: FAMILY MEDICINE | Facility: CLINIC | Age: 60
End: 2020-11-03
Payer: COMMERCIAL

## 2020-11-03 DIAGNOSIS — Z01.818 PREOP TESTING: ICD-10-CM

## 2020-11-03 PROCEDURE — U0003 INFECTIOUS AGENT DETECTION BY NUCLEIC ACID (DNA OR RNA); SEVERE ACUTE RESPIRATORY SYNDROME CORONAVIRUS 2 (SARS-COV-2) (CORONAVIRUS DISEASE [COVID-19]), AMPLIFIED PROBE TECHNIQUE, MAKING USE OF HIGH THROUGHPUT TECHNOLOGIES AS DESCRIBED BY CMS-2020-01-R: HCPCS

## 2020-11-04 LAB — SARS-COV-2 RNA RESP QL NAA+PROBE: NOT DETECTED

## 2020-11-05 ENCOUNTER — TELEPHONE (OUTPATIENT)
Dept: GASTROENTEROLOGY | Facility: CLINIC | Age: 60
End: 2020-11-05

## 2020-11-05 NOTE — TELEPHONE ENCOUNTER
----- Message from Marie Pham MA sent at 11/5/2020  8:15 AM CST -----  PT is requesting a call back in regards to her PROCS details . PROCS is ruslan for Tomorrow   Call back Back # [phone]

## 2020-11-05 NOTE — TELEPHONE ENCOUNTER
Pt stated that she missed a call from the surgery center but they left a message with her procedure arrival time and other instructions on it. Informed pt that we did not need anything else from her but for her to show up on tomorrow. Pt verbalized understanding.

## 2020-11-06 ENCOUNTER — ANESTHESIA EVENT (OUTPATIENT)
Dept: ENDOSCOPY | Facility: HOSPITAL | Age: 60
End: 2020-11-06
Payer: COMMERCIAL

## 2020-11-06 ENCOUNTER — ANESTHESIA (OUTPATIENT)
Dept: ENDOSCOPY | Facility: HOSPITAL | Age: 60
End: 2020-11-06
Payer: COMMERCIAL

## 2020-11-06 ENCOUNTER — HOSPITAL ENCOUNTER (OUTPATIENT)
Facility: HOSPITAL | Age: 60
Discharge: HOME | End: 2020-11-06
Attending: INTERNAL MEDICINE | Admitting: INTERNAL MEDICINE
Payer: COMMERCIAL

## 2020-11-06 VITALS
WEIGHT: 171 LBS | BODY MASS INDEX: 28.49 KG/M2 | OXYGEN SATURATION: 99 % | HEART RATE: 78 BPM | HEIGHT: 65 IN | RESPIRATION RATE: 19 BRPM | TEMPERATURE: 98 F | SYSTOLIC BLOOD PRESSURE: 132 MMHG | DIASTOLIC BLOOD PRESSURE: 77 MMHG

## 2020-11-06 DIAGNOSIS — R12 WATERBRASH: ICD-10-CM

## 2020-11-06 DIAGNOSIS — R10.9 RIGHT SIDED ABDOMINAL PAIN: ICD-10-CM

## 2020-11-06 DIAGNOSIS — R63.4 WEIGHT LOSS: ICD-10-CM

## 2020-11-06 DIAGNOSIS — R09.A2 GLOBUS SENSATION: ICD-10-CM

## 2020-11-06 LAB
GLUCOSE SERPL-MCNC: 87 MG/DL (ref 70–110)
H PYLORI INDEX VALUE: NEGATIVE

## 2020-11-06 PROCEDURE — 43239 EGD BIOPSY SINGLE/MULTIPLE: CPT | Mod: 59,,, | Performed by: INTERNAL MEDICINE

## 2020-11-06 PROCEDURE — 82962 GLUCOSE BLOOD TEST: CPT | Mod: PO | Performed by: INTERNAL MEDICINE

## 2020-11-06 PROCEDURE — 63600175 PHARM REV CODE 636 W HCPCS: Mod: PO | Performed by: INTERNAL MEDICINE

## 2020-11-06 PROCEDURE — D9220A PRA ANESTHESIA: Mod: CRNA,,, | Performed by: NURSE ANESTHETIST, CERTIFIED REGISTERED

## 2020-11-06 PROCEDURE — 25000003 PHARM REV CODE 250: Mod: PO | Performed by: NURSE ANESTHETIST, CERTIFIED REGISTERED

## 2020-11-06 PROCEDURE — 88305 TISSUE EXAM BY PATHOLOGIST: CPT | Mod: 26,,, | Performed by: PATHOLOGY

## 2020-11-06 PROCEDURE — 37000008 HC ANESTHESIA 1ST 15 MINUTES: Mod: PO | Performed by: INTERNAL MEDICINE

## 2020-11-06 PROCEDURE — 43239 EGD BIOPSY SINGLE/MULTIPLE: CPT | Mod: PO | Performed by: INTERNAL MEDICINE

## 2020-11-06 PROCEDURE — D9220A PRA ANESTHESIA: Mod: ANES,,, | Performed by: ANESTHESIOLOGY

## 2020-11-06 PROCEDURE — 43248 EGD GUIDE WIRE INSERTION: CPT | Mod: ,,, | Performed by: INTERNAL MEDICINE

## 2020-11-06 PROCEDURE — 63600175 PHARM REV CODE 636 W HCPCS: Mod: PO | Performed by: NURSE ANESTHETIST, CERTIFIED REGISTERED

## 2020-11-06 PROCEDURE — 88305 TISSUE EXAM BY PATHOLOGIST: CPT | Mod: 59 | Performed by: PATHOLOGY

## 2020-11-06 PROCEDURE — 27201012 HC FORCEPS, HOT/COLD, DISP: Mod: PO | Performed by: INTERNAL MEDICINE

## 2020-11-06 PROCEDURE — D9220A PRA ANESTHESIA: ICD-10-PCS | Mod: CRNA,,, | Performed by: NURSE ANESTHETIST, CERTIFIED REGISTERED

## 2020-11-06 PROCEDURE — 43248 PR EGD, FLEX, W/DILATION OVER GUIDEWIRE: ICD-10-PCS | Mod: ,,, | Performed by: INTERNAL MEDICINE

## 2020-11-06 PROCEDURE — 37000009 HC ANESTHESIA EA ADD 15 MINS: Mod: PO | Performed by: INTERNAL MEDICINE

## 2020-11-06 PROCEDURE — 88305 TISSUE EXAM BY PATHOLOGIST: ICD-10-PCS | Mod: 26,,, | Performed by: PATHOLOGY

## 2020-11-06 PROCEDURE — D9220A PRA ANESTHESIA: ICD-10-PCS | Mod: ANES,,, | Performed by: ANESTHESIOLOGY

## 2020-11-06 PROCEDURE — 43248 EGD GUIDE WIRE INSERTION: CPT | Mod: PO | Performed by: INTERNAL MEDICINE

## 2020-11-06 PROCEDURE — 43239 PR EGD, FLEX, W/BIOPSY, SGL/MULTI: ICD-10-PCS | Mod: 59,,, | Performed by: INTERNAL MEDICINE

## 2020-11-06 PROCEDURE — 87449 NOS EACH ORGANISM AG IA: CPT | Mod: PO | Performed by: INTERNAL MEDICINE

## 2020-11-06 RX ORDER — SODIUM CHLORIDE 0.9 % (FLUSH) 0.9 %
10 SYRINGE (ML) INJECTION
Status: DISCONTINUED | OUTPATIENT
Start: 2020-11-06 | End: 2020-11-06 | Stop reason: HOSPADM

## 2020-11-06 RX ORDER — FAMOTIDINE 40 MG/1
40 TABLET, FILM COATED ORAL
Qty: 60 TABLET | Refills: 6 | Status: SHIPPED | OUTPATIENT
Start: 2020-11-06 | End: 2021-02-03

## 2020-11-06 RX ORDER — PROPOFOL 10 MG/ML
VIAL (ML) INTRAVENOUS
Status: DISCONTINUED | OUTPATIENT
Start: 2020-11-06 | End: 2020-11-06

## 2020-11-06 RX ORDER — SODIUM CHLORIDE, SODIUM LACTATE, POTASSIUM CHLORIDE, CALCIUM CHLORIDE 600; 310; 30; 20 MG/100ML; MG/100ML; MG/100ML; MG/100ML
INJECTION, SOLUTION INTRAVENOUS CONTINUOUS
Status: DISCONTINUED | OUTPATIENT
Start: 2020-11-06 | End: 2020-11-06 | Stop reason: HOSPADM

## 2020-11-06 RX ORDER — FENTANYL CITRATE 50 UG/ML
INJECTION, SOLUTION INTRAMUSCULAR; INTRAVENOUS
Status: DISCONTINUED | OUTPATIENT
Start: 2020-11-06 | End: 2020-11-06

## 2020-11-06 RX ORDER — LIDOCAINE HYDROCHLORIDE 20 MG/ML
INJECTION INTRAVENOUS
Status: DISCONTINUED | OUTPATIENT
Start: 2020-11-06 | End: 2020-11-06

## 2020-11-06 RX ADMIN — PROPOFOL 25 MG: 10 INJECTION, EMULSION INTRAVENOUS at 10:11

## 2020-11-06 RX ADMIN — LIDOCAINE HYDROCHLORIDE 100 MG: 20 INJECTION, SOLUTION INTRAVENOUS at 10:11

## 2020-11-06 RX ADMIN — FENTANYL CITRATE 50 MCG: 50 INJECTION, SOLUTION INTRAMUSCULAR; INTRAVENOUS at 10:11

## 2020-11-06 RX ADMIN — SODIUM CHLORIDE, SODIUM LACTATE, POTASSIUM CHLORIDE, AND CALCIUM CHLORIDE: .6; .31; .03; .02 INJECTION, SOLUTION INTRAVENOUS at 10:11

## 2020-11-06 RX ADMIN — PROPOFOL 75 MG: 10 INJECTION, EMULSION INTRAVENOUS at 10:11

## 2020-11-06 NOTE — ANESTHESIA POSTPROCEDURE EVALUATION
Anesthesia Post Evaluation    Patient: Laura Aquino    Procedure(s) Performed: Procedure(s) (LRB):  EGD (ESOPHAGOGASTRODUODENOSCOPY) (N/A)    Final Anesthesia Type: general    Patient location during evaluation: PACU  Patient participation: Yes- Able to Participate  Level of consciousness: awake and alert and oriented  Post-procedure vital signs: reviewed and stable  Pain management: adequate  Airway patency: patent    PONV status at discharge: No PONV  Anesthetic complications: no      Cardiovascular status: blood pressure returned to baseline and stable  Respiratory status: unassisted and spontaneous ventilation  Hydration status: euvolemic  Follow-up not needed.          Vitals Value Taken Time   /77 11/06/20 1101   Temp 36.6 °C (97.9 °F) 11/06/20 1051   Pulse 78 11/06/20 1101   Resp 19 11/06/20 1101   SpO2 99 % 11/06/20 1101         Event Time   Out of Recovery 11:29:00         Pain/Yaneli Score: Yaneli Score: 10 (11/6/2020 11:09 AM)

## 2020-11-06 NOTE — DISCHARGE INSTRUCTIONS
Recovery After Procedural Sedation (Adult)   You have been given medicine by vein to make you sleep during your surgery. This may have included both a pain medicine and sleeping medicine. Most of the effects have worn off. But you may still have some drowsiness for the next 6 to 8 hours.  Home care  Follow these guidelines when you get home:  · For the next 8 hours, you should be watched by a responsible adult. This person should make sure your condition is not getting worse.  · Don't drink any alcohol for the next 24 hours.  · Don't drive, operate dangerous machinery, or make important business or personal decisions during the next 24 hours.  · To prevent injury or falls, use caution when standing and walking for at least 24 hours after your procedure.  Note: Your healthcare provider may tell you not to take any medicine by mouth for pain or sleep in the next 4 hours. These medicines may react with the medicines you were given in the hospital. This could cause a much stronger response than usual.  Follow-up care  Follow up with your healthcare provider if you are not alert and back to your usual level of activity within 12 hours.  When to seek medical advice  Call your healthcare provider right away if any of these occur:  · Drowsiness gets worse  · Weakness or dizziness gets worse  · Repeated vomiting  · You can't be awakened  · Fever  · New rash  [employer] last reviewed this educational content on 9/1/2019  © 6753-4034 [employer]. [address] [place] [address]. All rights reserved. This information is not intended as a substitute for professional medical care. Always follow your healthcare professional's instructions.        Tips to Control Acid Reflux    To control acid reflux, youll need to make some basic diet and lifestyle changes. The simple steps outlined below may be all youll need to ease discomfort.  Watch what you eat  · Avoid fatty foods and spicy foods.  · Eat fewer  acidic foods, such as citrus and tomato-based foods. These can increase symptoms.  · Limit drinking alcohol, caffeine, and fizzy beverages. All increase acid reflux.  · Try limiting chocolate, peppermint, and spearmint. These can worsen acid reflux in some people.  Watch when you eat  · Avoid lying down for 3 hours after eating.  · Do not snack before going to bed.  Raise your head  Raising your head and upper body by 4 to 6 inches helps limit reflux when youre lying down. Put blocks under the head of your bed frame to raise it.  Other changes  · Lose weight, if you need to  · Dont exercise near bedtime  · Avoid tight-fitting clothes  · Limit aspirin and ibuprofen  · Stop smoking   Date Last Reviewed: 7/1/2016 © 2000-2017 [employer]. [address] [place] [address], [place] [address]. All rights reserved. This information is not intended as a substitute for professional medical care. Always follow your healthcare professional's instructions.        High-Fiber Diet  Fiber is in fruits, vegetables, cereals, and grains. Fiber passes through your body undigested. A high-fiber diet helps food move through your intestinal tract. The added bulk is helpful in preventing constipation. In people with diverticulosis, fiber helps clean out the pouches along the colon wall. It also prevents new pouches from forming. A high-fiber diet reduces the risk of colon cancer. It also lowers blood cholesterol and prevents high blood sugar in people with diabetes.    The fiber-rich foods listed below should be part of your diet. If you are not used to high-fiber foods, start with 1 or 2 foods from this list. Every 3 to 4 days add a new one to your diet. Do this until you are eating 4 high-fiber foods per day. This should give you 20 to 35 grams of fiber a day. It is also important to drink a lot of water when you are on this diet. You should have 6 to 8 glasses of water a day. Water makes the fiber swell and increases the  benefit.  Foods high in dietary fiber  The following foods are high in dietary fiber:  · Breads. Breads made with 100% whole-wheat flour; vishal, wheat, or rye crackers; whole-grain tortillas, bran muffins.  · Cereals. Whole-grain and bran cereals with bran (shredded wheat, wheat flakes, raisin bran, corn bran); oatmeal, rolled oats, granola, and brown rice.  · Fruits. Fresh fruits and their edible skins (pears, prunes, raisins, berries, apples, and apricots); bananas, citrus fruit, mangoes, pineapple; and prune juice.  · Nuts. Any nuts and seeds.  · Vegetables. Best served raw or lightly cooked. All types, especially: green peas, celery, eggplant, potatoes, spinach, broccoli, [place] sprouts, winter squash, carrots, cauliflower, soybeans, lentils, and fresh and dried beans of all kinds.  · Other. Popcorn, any spices.  Date Last Reviewed: 8/1/2016  © 9980-6692 [employer]. [address] [place] [address], [place] [address]. All rights reserved. This information is not intended as a substitute for professional medical care. Always follow your healthcare professional's instructions.

## 2020-11-06 NOTE — BRIEF OP NOTE
Discharge Note  Short Stay      SUMMARY     Admit Date: 11/6/2020    Attending Physician: Hermilo Quispe Jr., MD     Discharge Physician: Hermilo Quispe Jr., MD    Discharge Date: 11/6/2020 11:16 AM    Final Diagnosis: Globus sensation [R09.89]  Waterbrash [R12]    Impression:          - Normal oropharynx.                        - Normal upper third of esophagus and middle third                        of esophagus.                        - Reflux esophagitis.                        - Z-line regular, 39 cm from the incisors.                        - Patulous lower esophageal sphincter.                        - Non-erosive esophageal reflux (NERD) disease(?).                        - No endoscopic esophageal abnormality to explain                        patient's globus. Esophagus dilated, 51 Fr.                        - Normal cardia.                        - Gastritis. Biopsied.                        - Normal stomach.                        - Normal pylorus.                        - Normal examined duodenum. Biopsied.                        - Normal major papilla.   Recommendation:      - Discharge patient to home.                        - Observe patient's clinical course following                        today's procedure with therapeutic intervention.                        - Await pathology and CLOtest results.                        - Continue present medications.                        - Use Pepcid (famotidine) 40 mg PO BID, ac meals.                        - Call the G.I. clinic in 2 weeks for reports (if                        you haven't heard from us sooner) [phone].                        - Return to GI clinic in 4-6 weeks.                        - Consider next: Perform CT scan (computed                        tomography) of the abdomen with contrast.   Hermilo Quispe MD   11/6/2020     Disposition: HOME OR SELF CARE    Patient Instructions:   Current Discharge Medication List      CONTINUE  these medications which have CHANGED    Details   famotidine (PEPCID) 40 MG tablet Take 1 tablet (40 mg total) by mouth 2 (two) times daily before meals. Ac BF & 1 hr ac Supper.  Qty: 60 tablet, Refills: 6    Associated Diagnoses: Globus sensation; Waterbrash; Right sided abdominal pain         CONTINUE these medications which have NOT CHANGED    Details   amLODIPine (NORVASC) 5 MG tablet Take 1 tablet (5 mg total) by mouth once daily.  Qty: 90 tablet, Refills: 4    Comments: .  Associated Diagnoses: Essential hypertension      atorvastatin (LIPITOR) 40 MG tablet Take 1 tablet (40 mg total) by mouth once daily.  Qty: 90 tablet, Refills: 4    Associated Diagnoses: Mixed hyperlipidemia      linaCLOtide (LINZESS) 145 mcg Cap capsule Take 1 capsule (145 mcg total) by mouth once daily. Take >30 minutes before 1st meal of the day.  Qty: 30 capsule, Refills: 2    Associated Diagnoses: Chronic constipation      magnesium oxide (MAG-OX) 400 mg (241.3 mg magnesium) tablet Take 1 tablet (400 mg total) by mouth once daily.  Refills: 0      metFORMIN (GLUCOPHAGE) 500 MG tablet Take 1 tablet (500 mg total) by mouth daily with breakfast.  Qty: 90 tablet, Refills: 3    Associated Diagnoses: Type 2 diabetes mellitus with hyperglycemia, without long-term current use of insulin      ALPRAZolam (XANAX) 1 MG tablet Take 1 mg by mouth daily as needed for Anxiety.      azelastine (ASTELIN) 137 mcg (0.1 %) nasal spray USE 2 SPRAY(S) IN EACH NOSTRIL TWICE DAILY      levocetirizine (XYZAL) 5 MG tablet Take 1 tablet (5 mg total) by mouth every evening.  Qty: 30 tablet, Refills: 0    Associated Diagnoses: Acute bronchitis, unspecified organism      nitroGLYCERIN (NITROSTAT) 0.4 MG SL tablet Place 1 tablet (0.4 mg total) under the tongue every 5 (five) minutes as needed for Chest pain.  Qty: 100 tablet, Refills: 0             Discharge Procedure Orders (must include Diet, Follow-up, Activity)    Follow Up:  Follow up with PCP as per your  routine.  Please follow an anti reflux diet and a high fiber diet.  Activity as tolerated.    No driving day of procedure.

## 2020-11-06 NOTE — [employer] PATIENT INSTRUCTIONS
Discharge Summary/Instructions after an Endoscopic Procedure  Patient Name: Laura Aquino  Patient MRN: [id number]  Patient YOB: 1960  Friday, November 6, 2020  Hermilo Quispe MD  RESTRICTIONS:  During your procedure today, you received medications for sedation.  These   medications may affect your judgment, balance and coordination.  Therefore,   for 24 hours, you have the following restrictions:   - DO NOT drive a car, operate machinery, make legal/financial decisions,   sign important papers or drink alcohol.    ACTIVITY:  Today: no heavy lifting, straining or running due to procedural   sedation/anesthesia.  The following day: return to full activity including work.  DIET:  Eat and drink normally unless instructed otherwise.     TREATMENT FOR COMMON SIDE EFFECTS:  - Mild abdominal pain, nausea, belching, bloating or excessive gas:  rest,   eat lightly and use a heating pad.  - Sore Throat: treat with throat lozenges and/or gargle with warm salt   water.  - Because air was used during the procedure, expelling large amounts of air   from your rectum or belching is normal.  - If a bowel prep was taken, you may not have a bowel movement for 1-3 days.    This is normal.  SYMPTOMS TO WATCH FOR AND REPORT TO YOUR PHYSICIAN:  1. Abdominal pain or bloating, other than gas cramps.  2. Chest pain.  3. Back pain.  4. Signs of infection such as: chills or fever occurring within 24 hours   after the procedure.  5. Rectal bleeding, which would show as bright red, maroon, or black stools.   (A tablespoon of blood from the rectum is not serious, especially if   hemorrhoids are present.)  6. Vomiting.  7. Weakness or dizziness.  GO DIRECTLY TO THE NEAREST EMERGENCY ROOM IF YOU HAVE ANY OF THE FOLLOWING:      Difficulty breathing              Chills and/or fever over 101 F   Persistent vomiting and/or vomiting blood   Severe abdominal pain   Severe chest pain   Black, tarry stools   Bleeding- more than one  tablespoon   Any other symptom or condition that you feel may need urgent attention  Your doctor recommends these additional instructions:  If any biopsies were taken, your doctors clinic will contact you in 1 to 2   weeks with any results.  Continue your present medications.   Increase Pepcid (famotidine) 40 mg by mouth to twice a day, before breakfast   and 1 hr before supper.   Return to your GI clinic in 4-6 weeks.   .  For questions, problems or results please call your physician - Hermilo Quispe MD at Work:  [phone].  EMERGENCY PHONE NUMBER: [phone], LAB RESULTS: [phone]  IF A COMPLICATION OR EMERGENCY SITUATION ARISES AND YOU ARE UNABLE TO REACH   YOUR PHYSICIAN - GO DIRECTLY TO THE EMERGENCY ROOM.  ___________________________________________  Nurse Signature  ___________________________________________  Patient/Designated Responsible Party Signature  Hermilo Quispe MD  11/6/2020 11:13:24 AM  This report has been verified and signed electronically.  [employer]

## 2020-11-06 NOTE — H&P
History & Physical - Short Stay  Gastroenterology      SUBJECTIVE:     Procedure: Gastroscopy    Chief Complaint/Indication for Procedure: R side abdo pain.  Globus sensation. Weight loss.    History of Present Illness:  Office Visit    10/26/2020  [place] - Gastroenterology     KITTY Jean  Gastroenterology  Right sided abdominal pain +6 more  Dx  GI Problem   ; Referred by Aaareferral Self  Reason for Visit   Progress Notes  KITTY Jean (Nurse Practitioner)   Gastroenterology   10/26/2020  1:30 PM   Signed     Subjective:       Patient ID: Laura Aquino is a 60 y.o. female Body mass index is 28.47 kg/m².     Chief Complaint: GI Problem (Right-sided Abdominal Pain, Constipation, globus sensation)     This patient is new to me.     GI Problem  The primary symptoms include weight loss (lost ~39 lbs since 10/2019 with dieting) and abdominal pain. Primary symptoms do not include fever, fatigue, nausea, vomiting, diarrhea, melena, hematemesis, hematochezia or dysuria. Primary symptoms comment: CHIEF COMPLAINT: GLOBUS SENSATION- lump in throat sensation.   The abdominal pain began more than 2 days ago (started after her tonsillectomy on 9/16/2020). Pain location: described as constant, pressure to mid right abdomen. The severity of the abdominal pain is 2/10 (currently). The abdominal pain is relieved by bowel movements (worse when constipated).   The illness is also significant for constipation (chronic for several years; worsened since her tonsillectomy, bowel movements are once daily of small amount of stool; PAST TREATMENT: metamucil- no relief, miralax x3 days- no relief, smooth move tea- mild relief, coconut oil helped) and tenesmus. The illness does not include chills, dysphagia or odynophagia. Significant associated medical issues include GERD (occasional waterbrash, bad aftertaste in her mouth after she eats something; ENT prescribed protonix, she took it for a few months but  stopped because it worsened her constipation and it didn't help globus sensation).      Review of Systems   Constitutional: Positive for weight loss (lost ~39 lbs since 10/2019 with dieting). Negative for appetite change, chills, fatigue and fever.   HENT: Positive for sore throat (since tonsillectomy, has not changed). Negative for trouble swallowing and voice change.    Respiratory: Positive for cough (nonproductive intermittent (not daily)). Negative for choking and shortness of breath.    Cardiovascular: Negative for chest pain.   Gastrointestinal: Positive for abdominal pain and constipation (chronic for several years; worsened since her tonsillectomy, bowel movements are once daily of small amount of stool; PAST TREATMENT: metamucil- no relief, miralax x3 days- no relief, smooth move tea- mild relief, coconut oil helped). Negative for anal bleeding, blood in stool, diarrhea, dysphagia, hematemesis, hematochezia, melena, nausea, rectal pain and vomiting.     Wt Readings from Last 10 Encounters:   10/26/20 77.6 kg (171 lb 1.2 oz)   09/16/20 81.3 kg (179 lb 3.7 oz)   08/25/20 83.9 kg (185 lb)   07/06/20 83.9 kg (185 lb)   03/16/20 94.6 kg (208 lb 8 oz)   12/03/19 95.5 kg (210 lb 8 oz)   10/23/19 96.8 kg (213 lb 6.5 oz)   10/14/19 95.3 kg (210 lb)   10/08/19 94.5 kg (208 lb 5.4 oz)   09/25/19 96.2 kg (212 lb)     Assessment:       1. Right sided abdominal pain    2. Chronic constipation    3. Globus sensation    4. Waterbrash    5. Sore throat    6. S/P tonsillectomy    7. Cough        Plan:       Right sided abdominal pain  -     Comprehensive Metabolic Panel; Future; Expected date: 10/26/2020  -     US Abdomen Complete; Future; Expected date: 10/26/2020  -  START   famotidine (PEPCID) 40 MG tablet; Take 1 tablet (40 mg total) by mouth once daily.  Dispense: 30 tablet; Refill: 2  -     CBC Without Differential; Future; Expected date: 10/26/2020  -     Lipase; Future; Expected date: 10/26/2020  - schedule EGD,  discussed procedure with patient, including risks and benefits, patient verbalized understanding  - Possible CT SCAN pending results of testing and if symptoms persist     Chronic constipation  -  START   linaCLOtide (LINZESS) 145 mcg Cap capsule; Take 1 capsule (145 mcg total) by mouth once daily. Take >30 minutes before 1st meal of the day.  Dispense: 30 capsule; Refill: 2  Recommend daily exercise as tolerated, adequate water intake (six 8-oz glasses of water daily), and high fiber diet. OTC fiber supplements are recommended if diet does not reach daily fiber goal (20-30 grams daily), such as Metamucil, Citrucel, or FiberCon (take as directed, separate from other oral medications by >2 hours).  -If still no improvement with these measures, call/follow-up     Globus sensation  -  START   famotidine (PEPCID) 40 MG tablet; Take 1 tablet (40 mg total) by mouth once daily.  Dispense: 30 tablet; Refill: 2  - schedule EGD, discussed procedure with patient, including risks and benefits, patient verbalized understanding  Recommend follow-up with ENT for continued evaluation and management.     Waterbrash  -  START   famotidine (PEPCID) 40 MG tablet; Take 1 tablet (40 mg total) by mouth once daily.  Dispense: 30 tablet; Refill: 2  - schedule EGD, discussed procedure with patient, including risks and benefits, patient verbalized understanding     Sore throat & S/P tonsillectomy  Recommend follow-up with ENT for continued evaluation and management.     Cough  - schedule EGD, discussed procedure with patient, including risks and benefits, patient verbalized understanding  Recommend follow-up with Primary Care Provider for continued evaluation and management.     Follow up in about 1 month (around 11/26/2020), or if symptoms worsen or fail to improve.                U/S 10/29/2020:  FINDINGS:  There are multiple simple hepatic cysts which are essentially unchanged.  The cysts include a 2.0 cm cyst in the left lobe, a 1.4 cm  cyst in the right lobe, and a 0.7 cm cyst in the anterior right lobe.  No solid hepatic masses are present.  The gallbladder and bile ducts are normal.  Common bile duct diameter is 2.2 mm.  Doppler of the main portal vein confirms a normal waveform and direction of flow.  The spleen and pancreas are normal in size and appearance.  Both kidneys are normal in size and appearance.  The right kidney measures 9.4 cm in length and the left kidney measures 9.3 cm in length.  The aorta tapers normally and the inferior vena cava is unremarkable.  No ascites is present.     Impression:   Multiple simple hepatic cysts which are unchanged.   Electronically signed by: Dallin Salas MD  Date:                                            10/29/2020        PTA Medications   Medication Sig    amLODIPine (NORVASC) 5 MG tablet Take 1 tablet (5 mg total) by mouth once daily.    atorvastatin (LIPITOR) 40 MG tablet Take 1 tablet (40 mg total) by mouth once daily.    famotidine (PEPCID) 40 MG tablet Take 1 tablet (40 mg total) by mouth once daily.    linaCLOtide (LINZESS) 145 mcg Cap capsule Take 1 capsule (145 mcg total) by mouth once daily. Take >30 minutes before 1st meal of the day.    magnesium oxide (MAG-OX) 400 mg (241.3 mg magnesium) tablet Take 1 tablet (400 mg total) by mouth once daily.    metFORMIN (GLUCOPHAGE) 500 MG tablet Take 1 tablet (500 mg total) by mouth daily with breakfast.    ALPRAZolam (XANAX) 1 MG tablet Take 1 mg by mouth daily as needed for Anxiety.    azelastine (ASTELIN) 137 mcg (0.1 %) nasal spray USE 2 SPRAY(S) IN EACH NOSTRIL TWICE DAILY    levocetirizine (XYZAL) 5 MG tablet Take 1 tablet (5 mg total) by mouth every evening.    nitroGLYCERIN (NITROSTAT) 0.4 MG SL tablet Place 1 tablet (0.4 mg total) under the tongue every 5 (five) minutes as needed for Chest pain. (Patient not taking: Reported on 10/26/2020)       Review of patient's allergies indicates:  No Known Allergies     Past Medical  History:   Diagnosis Date    Abdominal wall mass     Abnormal results of liver function studies     AD (atopic dermatitis)     Diabetes mellitus     Diverticulosis     Hepatic steatosis     Hyperlipidemia     Hypertension     Precordial pain 10/2019    Skin sensation disturbance      Past Surgical History:   Procedure Laterality Date    ANGIOGRAM, CORONARY, WITH LEFT HEART CATHETERIZATION N/A 10/14/2019    Procedure: Angiogram, Coronary, with Left Heart Cath;  Surgeon: Franki Carroll MD;  Location: [facility] CATH;  Service: Cardiology;  Laterality: N/A;    COLONOSCOPY  2020    Dr. Lopez, in procedures: diverticulosis, fair prep; repeat in 5 years for screening    HYSTERECTOMY      Complete    LEFT HEART CATHETERIZATION Left 10/14/2019    Procedure: Left heart cath;  Surgeon: Franki Carroll MD;  Location: [facility] CATH;  Service: Cardiology;  Laterality: Left;    LIPOMA RESECTION      TONSILLECTOMY Bilateral 2020    Procedure: TONSILLECTOMY;  Surgeon: Pk Kirkpatrick MD;  Location: [facility] CSC;  Service: ENT;  Laterality: Bilateral;    TOTAL ABDOMINAL HYSTERECTOMY W/ BILATERAL SALPINGOOPHORECTOMY Bilateral     w/ hysterectomy    TUBAL LIGATION       Family History   Problem Relation Age of Onset    Asthma Mother     Diabetes Mother     Anxiety disorder Sister     Anxiety disorder Brother     Diabetes Father     Sarcoidosis Daughter     HIV Son     No Known Problems Son     No Known Problems Son     Colon cancer Neg Hx     Colon polyps Neg Hx     Crohn's disease Neg Hx     Ulcerative colitis Neg Hx     Stomach cancer Neg Hx     Esophageal cancer Neg Hx      Social History     Tobacco Use    Smoking status: Former Smoker     Packs/day: 1.00     Years: 5.00     Pack years: 5.00     Types: Cigarettes     Quit date: 10/1/1993     Years since quittin.1    Smokeless tobacco: Never Used   Substance Use Topics    Alcohol use: No     Alcohol/week: 0.0 standard drinks     "Drug use: No         OBJECTIVE:     Vital Signs (Most Recent)  Temp: 97.9 °F (36.6 °C) (11/06/20 0954)  Pulse: 72 (11/06/20 0954)  Resp: 17 (11/06/20 0954)  BP: (!) 154/79 (11/06/20 0956)  SpO2: 99 % (11/06/20 0954)    Physical Exam:  :Ht 5' 5" (1.651 m)   Wt 77.6 kg (171 lb 1.2 oz)   BMI 28.47 kg/m²                                                        GENERAL:  Comfortable, in no acute distress.                                 HEENT EXAM:  Nonicteric.  No adenopathy.  Oropharynx is clear.               NECK:  Supple.                                                               LUNGS:  Clear.                                                               CARDIAC:  Regular rate and rhythm.  S1, S2.  No murmur.                      ABDOMEN:  Soft, positive bowel sounds, nontender.  No hepatosplenomegaly or masses.  No rebound or guarding.                                             EXTREMITIES:  No edema.     MENTAL STATUS:  Alert and oriented.    ASSESSMENT/PLAN:     Assessment: R side abdo pain.  Globus sensation. Weight loss.    Plan: Gastroscopy    Anesthesia Plan:   MAC / General Anaesthesia    ASA Grade: ASA 2 - Patient with mild systemic disease with no functional limitations    MALLAMPATI SCORE: II (hard and soft palate, upper portion of tonsils anduvula visible)    "

## 2020-11-06 NOTE — TRANSFER OF CARE
"Anesthesia Transfer of Care Note    Patient: Laura Aquino    Procedure(s) Performed: Procedure(s) (LRB):  EGD (ESOPHAGOGASTRODUODENOSCOPY) (N/A)    Patient location: PACU    Anesthesia Type: general    Transport from OR: Transported from OR on room air with adequate spontaneous ventilation    Post pain: adequate analgesia    Post assessment: no apparent anesthetic complications and tolerated procedure well    Post vital signs: stable    Level of consciousness: responds to stimulation    Nausea/Vomiting: no nausea/vomiting    Complications: none    Transfer of care protocol was followed      Last vitals:   Visit Vitals  BP (!) 142/74   Pulse 72   Temp 36.6 °C (97.9 °F) (Skin)   Resp 17   Ht 5' 5" (1.651 m)   Wt 77.6 kg (171 lb)   SpO2 99%   Breastfeeding No   BMI 28.46 kg/m²     "

## 2020-11-12 LAB
FINAL PATHOLOGIC DIAGNOSIS: NORMAL
GROSS: NORMAL
Lab: NORMAL

## 2020-12-28 ENCOUNTER — TELEPHONE (OUTPATIENT)
Dept: GASTROENTEROLOGY | Facility: CLINIC | Age: 60
End: 2020-12-28

## 2020-12-28 NOTE — TELEPHONE ENCOUNTER
----- Message from Paula Madrid sent at 12/28/2020  7:45 AM CST -----  Type:  Same Day Appointment Request    Caller is requesting a same day appointment.  Caller declined first available appointment listed below.      Name of Caller:  Patient   When is the first available appointment?  3/4   Symptoms:  Acid Reflux   Best Call Back Number:     Additional Information:   Please advise-thank you

## 2021-02-17 ENCOUNTER — OFFICE VISIT (OUTPATIENT)
Dept: UROLOGY | Facility: CLINIC | Age: 61
End: 2021-02-17
Payer: COMMERCIAL

## 2021-02-17 VITALS — HEIGHT: 65 IN | BODY MASS INDEX: 27.95 KG/M2 | WEIGHT: 167.75 LBS

## 2021-02-17 DIAGNOSIS — R31.9 HEMATURIA, UNSPECIFIED TYPE: Primary | ICD-10-CM

## 2021-02-17 DIAGNOSIS — R31.29 MICROSCOPIC HEMATURIA: ICD-10-CM

## 2021-02-17 DIAGNOSIS — R63.4 UNEXPLAINED WEIGHT LOSS: ICD-10-CM

## 2021-02-17 LAB
BILIRUB SERPL-MCNC: ABNORMAL MG/DL
BLOOD URINE, POC: ABNORMAL
CLARITY, POC UA: CLEAR
COLOR, POC UA: YELLOW
GLUCOSE UR QL STRIP: ABNORMAL
KETONES UR QL STRIP: ABNORMAL
LEUKOCYTE ESTERASE URINE, POC: ABNORMAL
NITRITE, POC UA: ABNORMAL
PH, POC UA: 5.5
PROTEIN, POC: ABNORMAL
SPECIFIC GRAVITY, POC UA: 1.01
UROBILINOGEN, POC UA: 0.2

## 2021-02-17 PROCEDURE — 1126F AMNT PAIN NOTED NONE PRSNT: CPT | Mod: S$GLB,,, | Performed by: UROLOGY

## 2021-02-17 PROCEDURE — 1126F PR PAIN SEVERITY QUANTIFIED, NO PAIN PRESENT: ICD-10-PCS | Mod: S$GLB,,, | Performed by: UROLOGY

## 2021-02-17 PROCEDURE — 81002 URINALYSIS NONAUTO W/O SCOPE: CPT | Mod: S$GLB,,, | Performed by: UROLOGY

## 2021-02-17 PROCEDURE — 99203 OFFICE O/P NEW LOW 30 MIN: CPT | Mod: 25,S$GLB,, | Performed by: UROLOGY

## 2021-02-17 PROCEDURE — 99203 PR OFFICE/OUTPT VISIT, NEW, LEVL III, 30-44 MIN: ICD-10-PCS | Mod: 25,S$GLB,, | Performed by: UROLOGY

## 2021-02-17 PROCEDURE — 3008F BODY MASS INDEX DOCD: CPT | Mod: CPTII,S$GLB,, | Performed by: UROLOGY

## 2021-02-17 PROCEDURE — 81002 POCT URINE DIPSTICK WITHOUT MICROSCOPE: ICD-10-PCS | Mod: S$GLB,,, | Performed by: UROLOGY

## 2021-02-17 PROCEDURE — 99999 PR PBB SHADOW E&M-EST. PATIENT-LVL III: CPT | Mod: PBBFAC,,, | Performed by: UROLOGY

## 2021-02-17 PROCEDURE — 3008F PR BODY MASS INDEX (BMI) DOCUMENTED: ICD-10-PCS | Mod: CPTII,S$GLB,, | Performed by: UROLOGY

## 2021-02-17 PROCEDURE — 99999 PR PBB SHADOW E&M-EST. PATIENT-LVL III: ICD-10-PCS | Mod: PBBFAC,,, | Performed by: UROLOGY

## 2021-03-29 ENCOUNTER — OFFICE VISIT (OUTPATIENT)
Dept: OPHTHALMOLOGY | Facility: CLINIC | Age: 61
End: 2021-03-29
Payer: COMMERCIAL

## 2021-03-29 DIAGNOSIS — H04.123 DRY EYES, BILATERAL: ICD-10-CM

## 2021-03-29 DIAGNOSIS — H40.013 OPEN ANGLE WITH BORDERLINE FINDINGS AND LOW GLAUCOMA RISK IN BOTH EYES: ICD-10-CM

## 2021-03-29 DIAGNOSIS — H43.391 VITREOUS FLOATERS OF RIGHT EYE: ICD-10-CM

## 2021-03-29 DIAGNOSIS — H25.13 AGE-RELATED NUCLEAR CATARACT OF BOTH EYES: Primary | ICD-10-CM

## 2021-03-29 DIAGNOSIS — H52.7 REFRACTIVE ERROR: ICD-10-CM

## 2021-03-29 DIAGNOSIS — Z87.898 HISTORY OF PREDIABETES: ICD-10-CM

## 2021-03-29 PROCEDURE — 92004 PR EYE EXAM, NEW PATIENT,COMPREHESV: ICD-10-PCS | Mod: S$GLB,,, | Performed by: OPHTHALMOLOGY

## 2021-03-29 PROCEDURE — 1126F AMNT PAIN NOTED NONE PRSNT: CPT | Mod: S$GLB,,, | Performed by: OPHTHALMOLOGY

## 2021-03-29 PROCEDURE — 1126F PR PAIN SEVERITY QUANTIFIED, NO PAIN PRESENT: ICD-10-PCS | Mod: S$GLB,,, | Performed by: OPHTHALMOLOGY

## 2021-03-29 PROCEDURE — 99999 PR PBB SHADOW E&M-EST. PATIENT-LVL III: ICD-10-PCS | Mod: PBBFAC,,, | Performed by: OPHTHALMOLOGY

## 2021-03-29 PROCEDURE — 99999 PR PBB SHADOW E&M-EST. PATIENT-LVL III: CPT | Mod: PBBFAC,,, | Performed by: OPHTHALMOLOGY

## 2021-03-29 PROCEDURE — 92015 DETERMINE REFRACTIVE STATE: CPT | Mod: S$GLB,,, | Performed by: OPHTHALMOLOGY

## 2021-03-29 PROCEDURE — 92004 COMPRE OPH EXAM NEW PT 1/>: CPT | Mod: S$GLB,,, | Performed by: OPHTHALMOLOGY

## 2021-03-29 PROCEDURE — 92015 PR REFRACTION: ICD-10-PCS | Mod: S$GLB,,, | Performed by: OPHTHALMOLOGY

## 2021-04-01 ENCOUNTER — PROCEDURE VISIT (OUTPATIENT)
Dept: UROLOGY | Facility: CLINIC | Age: 61
End: 2021-04-01
Payer: COMMERCIAL

## 2021-04-01 VITALS — HEIGHT: 65 IN | WEIGHT: 167.75 LBS | BODY MASS INDEX: 27.95 KG/M2

## 2021-04-01 DIAGNOSIS — R31.29 MICROSCOPIC HEMATURIA: ICD-10-CM

## 2021-04-01 PROCEDURE — 52000 CYSTOSCOPY: ICD-10-PCS | Mod: S$GLB,,, | Performed by: UROLOGY

## 2021-04-01 PROCEDURE — 52000 CYSTOURETHROSCOPY: CPT | Mod: S$GLB,,, | Performed by: UROLOGY

## 2021-06-21 ENCOUNTER — TELEPHONE (OUTPATIENT)
Dept: OPHTHALMOLOGY | Facility: CLINIC | Age: 61
End: 2021-06-21

## 2021-06-30 ENCOUNTER — OCCUPATIONAL HEALTH (OUTPATIENT)
Dept: URGENT CARE | Facility: CLINIC | Age: 61
End: 2021-06-30

## 2021-06-30 PROCEDURE — 99499 UNLISTED E&M SERVICE: CPT | Mod: S$GLB,,, | Performed by: EMERGENCY MEDICINE

## 2021-06-30 PROCEDURE — 99499 PR PHYSICAL - DOT/CDL: ICD-10-PCS | Mod: S$GLB,,, | Performed by: EMERGENCY MEDICINE

## 2021-08-27 ENCOUNTER — TELEPHONE (OUTPATIENT)
Dept: SPINE | Facility: CLINIC | Age: 61
End: 2021-08-27

## 2021-09-07 ENCOUNTER — OFFICE VISIT (OUTPATIENT)
Dept: SPINE | Facility: CLINIC | Age: 61
End: 2021-09-07
Payer: COMMERCIAL

## 2021-09-07 VITALS
SYSTOLIC BLOOD PRESSURE: 132 MMHG | DIASTOLIC BLOOD PRESSURE: 81 MMHG | HEIGHT: 65 IN | HEART RATE: 74 BPM | BODY MASS INDEX: 29.73 KG/M2 | WEIGHT: 178.44 LBS

## 2021-09-07 DIAGNOSIS — M54.16 LUMBAR RADICULOPATHY: ICD-10-CM

## 2021-09-07 DIAGNOSIS — M54.41 RIGHT-SIDED LOW BACK PAIN WITH RIGHT-SIDED SCIATICA, UNSPECIFIED CHRONICITY: ICD-10-CM

## 2021-09-07 PROCEDURE — 99999 PR PBB SHADOW E&M-EST. PATIENT-LVL V: ICD-10-PCS | Mod: PBBFAC,,, | Performed by: PHYSICIAN ASSISTANT

## 2021-09-07 PROCEDURE — 3075F SYST BP GE 130 - 139MM HG: CPT | Mod: CPTII,S$GLB,, | Performed by: PHYSICIAN ASSISTANT

## 2021-09-07 PROCEDURE — 99203 PR OFFICE/OUTPT VISIT, NEW, LEVL III, 30-44 MIN: ICD-10-PCS | Mod: S$GLB,,, | Performed by: PHYSICIAN ASSISTANT

## 2021-09-07 PROCEDURE — 1160F PR REVIEW ALL MEDS BY PRESCRIBER/CLIN PHARMACIST DOCUMENTED: ICD-10-PCS | Mod: CPTII,S$GLB,, | Performed by: PHYSICIAN ASSISTANT

## 2021-09-07 PROCEDURE — 99203 OFFICE O/P NEW LOW 30 MIN: CPT | Mod: S$GLB,,, | Performed by: PHYSICIAN ASSISTANT

## 2021-09-07 PROCEDURE — 1160F RVW MEDS BY RX/DR IN RCRD: CPT | Mod: CPTII,S$GLB,, | Performed by: PHYSICIAN ASSISTANT

## 2021-09-07 PROCEDURE — 3044F HG A1C LEVEL LT 7.0%: CPT | Mod: CPTII,S$GLB,, | Performed by: PHYSICIAN ASSISTANT

## 2021-09-07 PROCEDURE — 3079F PR MOST RECENT DIASTOLIC BLOOD PRESSURE 80-89 MM HG: ICD-10-PCS | Mod: CPTII,S$GLB,, | Performed by: PHYSICIAN ASSISTANT

## 2021-09-07 PROCEDURE — 1159F MED LIST DOCD IN RCRD: CPT | Mod: CPTII,S$GLB,, | Performed by: PHYSICIAN ASSISTANT

## 2021-09-07 PROCEDURE — 3079F DIAST BP 80-89 MM HG: CPT | Mod: CPTII,S$GLB,, | Performed by: PHYSICIAN ASSISTANT

## 2021-09-07 PROCEDURE — 1159F PR MEDICATION LIST DOCUMENTED IN MEDICAL RECORD: ICD-10-PCS | Mod: CPTII,S$GLB,, | Performed by: PHYSICIAN ASSISTANT

## 2021-09-07 PROCEDURE — 3008F BODY MASS INDEX DOCD: CPT | Mod: CPTII,S$GLB,, | Performed by: PHYSICIAN ASSISTANT

## 2021-09-07 PROCEDURE — 3075F PR MOST RECENT SYSTOLIC BLOOD PRESS GE 130-139MM HG: ICD-10-PCS | Mod: CPTII,S$GLB,, | Performed by: PHYSICIAN ASSISTANT

## 2021-09-07 PROCEDURE — 3008F PR BODY MASS INDEX (BMI) DOCUMENTED: ICD-10-PCS | Mod: CPTII,S$GLB,, | Performed by: PHYSICIAN ASSISTANT

## 2021-09-07 PROCEDURE — 99999 PR PBB SHADOW E&M-EST. PATIENT-LVL V: CPT | Mod: PBBFAC,,, | Performed by: PHYSICIAN ASSISTANT

## 2021-09-07 PROCEDURE — 3044F PR MOST RECENT HEMOGLOBIN A1C LEVEL <7.0%: ICD-10-PCS | Mod: CPTII,S$GLB,, | Performed by: PHYSICIAN ASSISTANT

## 2021-09-22 ENCOUNTER — OFFICE VISIT (OUTPATIENT)
Dept: SPINE | Facility: CLINIC | Age: 61
End: 2021-09-22
Payer: COMMERCIAL

## 2021-09-22 VITALS
HEIGHT: 65 IN | HEART RATE: 79 BPM | DIASTOLIC BLOOD PRESSURE: 78 MMHG | SYSTOLIC BLOOD PRESSURE: 131 MMHG | BODY MASS INDEX: 29.8 KG/M2 | WEIGHT: 178.88 LBS

## 2021-09-22 DIAGNOSIS — M79.671 PAIN IN BOTH FEET: ICD-10-CM

## 2021-09-22 DIAGNOSIS — M47.816 LUMBAR SPONDYLOSIS: ICD-10-CM

## 2021-09-22 DIAGNOSIS — M47.812 CERVICAL SPONDYLOSIS: ICD-10-CM

## 2021-09-22 DIAGNOSIS — M79.672 PAIN IN BOTH FEET: ICD-10-CM

## 2021-09-22 DIAGNOSIS — G62.9 NEUROPATHY: Primary | ICD-10-CM

## 2021-09-22 PROCEDURE — 99999 PR PBB SHADOW E&M-EST. PATIENT-LVL IV: ICD-10-PCS | Mod: PBBFAC,,, | Performed by: PHYSICIAN ASSISTANT

## 2021-09-22 PROCEDURE — 1159F MED LIST DOCD IN RCRD: CPT | Mod: CPTII,S$GLB,, | Performed by: PHYSICIAN ASSISTANT

## 2021-09-22 PROCEDURE — 3008F PR BODY MASS INDEX (BMI) DOCUMENTED: ICD-10-PCS | Mod: CPTII,S$GLB,, | Performed by: PHYSICIAN ASSISTANT

## 2021-09-22 PROCEDURE — 3078F PR MOST RECENT DIASTOLIC BLOOD PRESSURE < 80 MM HG: ICD-10-PCS | Mod: CPTII,S$GLB,, | Performed by: PHYSICIAN ASSISTANT

## 2021-09-22 PROCEDURE — 3075F PR MOST RECENT SYSTOLIC BLOOD PRESS GE 130-139MM HG: ICD-10-PCS | Mod: CPTII,S$GLB,, | Performed by: PHYSICIAN ASSISTANT

## 2021-09-22 PROCEDURE — 99214 OFFICE O/P EST MOD 30 MIN: CPT | Mod: S$GLB,,, | Performed by: PHYSICIAN ASSISTANT

## 2021-09-22 PROCEDURE — 1159F PR MEDICATION LIST DOCUMENTED IN MEDICAL RECORD: ICD-10-PCS | Mod: CPTII,S$GLB,, | Performed by: PHYSICIAN ASSISTANT

## 2021-09-22 PROCEDURE — 99999 PR PBB SHADOW E&M-EST. PATIENT-LVL IV: CPT | Mod: PBBFAC,,, | Performed by: PHYSICIAN ASSISTANT

## 2021-09-22 PROCEDURE — 99214 PR OFFICE/OUTPT VISIT, EST, LEVL IV, 30-39 MIN: ICD-10-PCS | Mod: S$GLB,,, | Performed by: PHYSICIAN ASSISTANT

## 2021-09-22 PROCEDURE — 3075F SYST BP GE 130 - 139MM HG: CPT | Mod: CPTII,S$GLB,, | Performed by: PHYSICIAN ASSISTANT

## 2021-09-22 PROCEDURE — 1160F RVW MEDS BY RX/DR IN RCRD: CPT | Mod: CPTII,S$GLB,, | Performed by: PHYSICIAN ASSISTANT

## 2021-09-22 PROCEDURE — 3008F BODY MASS INDEX DOCD: CPT | Mod: CPTII,S$GLB,, | Performed by: PHYSICIAN ASSISTANT

## 2021-09-22 PROCEDURE — 3044F HG A1C LEVEL LT 7.0%: CPT | Mod: CPTII,S$GLB,, | Performed by: PHYSICIAN ASSISTANT

## 2021-09-22 PROCEDURE — 1160F PR REVIEW ALL MEDS BY PRESCRIBER/CLIN PHARMACIST DOCUMENTED: ICD-10-PCS | Mod: CPTII,S$GLB,, | Performed by: PHYSICIAN ASSISTANT

## 2021-09-22 PROCEDURE — 3078F DIAST BP <80 MM HG: CPT | Mod: CPTII,S$GLB,, | Performed by: PHYSICIAN ASSISTANT

## 2021-09-22 PROCEDURE — 3044F PR MOST RECENT HEMOGLOBIN A1C LEVEL <7.0%: ICD-10-PCS | Mod: CPTII,S$GLB,, | Performed by: PHYSICIAN ASSISTANT

## 2021-09-22 RX ORDER — GABAPENTIN 300 MG/1
CAPSULE ORAL
Qty: 90 CAPSULE | Refills: 0 | Status: SHIPPED | OUTPATIENT
Start: 2021-09-22 | End: 2021-11-16

## 2021-09-23 ENCOUNTER — TELEPHONE (OUTPATIENT)
Dept: PODIATRY | Facility: CLINIC | Age: 61
End: 2021-09-23

## 2021-09-24 ENCOUNTER — OFFICE VISIT (OUTPATIENT)
Dept: OPHTHALMOLOGY | Facility: CLINIC | Age: 61
End: 2021-09-24
Payer: COMMERCIAL

## 2021-09-24 ENCOUNTER — CLINICAL SUPPORT (OUTPATIENT)
Dept: OPHTHALMOLOGY | Facility: CLINIC | Age: 61
End: 2021-09-24
Payer: COMMERCIAL

## 2021-09-24 DIAGNOSIS — H40.013 OPEN ANGLE WITH BORDERLINE FINDINGS AND LOW GLAUCOMA RISK IN BOTH EYES: Primary | ICD-10-CM

## 2021-09-24 DIAGNOSIS — H43.392 VITREOUS FLOATERS OF LEFT EYE: ICD-10-CM

## 2021-09-24 DIAGNOSIS — H52.7 REFRACTIVE ERROR: ICD-10-CM

## 2021-09-24 DIAGNOSIS — H25.13 AGE-RELATED NUCLEAR CATARACT OF BOTH EYES: ICD-10-CM

## 2021-09-24 DIAGNOSIS — H04.123 DRY EYES, BILATERAL: ICD-10-CM

## 2021-09-24 PROCEDURE — 92133 POSTERIOR SEGMENT OCT OPTIC NERVE(OCULAR COHERENCE TOMOGRAPHY) - OU - BOTH EYES: ICD-10-PCS | Mod: S$GLB,,, | Performed by: OPHTHALMOLOGY

## 2021-09-24 PROCEDURE — 1160F PR REVIEW ALL MEDS BY PRESCRIBER/CLIN PHARMACIST DOCUMENTED: ICD-10-PCS | Mod: CPTII,S$GLB,, | Performed by: OPHTHALMOLOGY

## 2021-09-24 PROCEDURE — 92012 PR EYE EXAM, EST PATIENT,INTERMED: ICD-10-PCS | Mod: S$GLB,,, | Performed by: OPHTHALMOLOGY

## 2021-09-24 PROCEDURE — 76514 ECHO EXAM OF EYE THICKNESS: CPT | Mod: S$GLB,,, | Performed by: OPHTHALMOLOGY

## 2021-09-24 PROCEDURE — 99999 PR PBB SHADOW E&M-EST. PATIENT-LVL III: CPT | Mod: PBBFAC,,, | Performed by: OPHTHALMOLOGY

## 2021-09-24 PROCEDURE — 99999 PR PBB SHADOW E&M-EST. PATIENT-LVL III: ICD-10-PCS | Mod: PBBFAC,,, | Performed by: OPHTHALMOLOGY

## 2021-09-24 PROCEDURE — 92020 GONIOSCOPY: CPT | Mod: S$GLB,,, | Performed by: OPHTHALMOLOGY

## 2021-09-24 PROCEDURE — 92083 EXTENDED VISUAL FIELD XM: CPT | Mod: S$GLB,,, | Performed by: OPHTHALMOLOGY

## 2021-09-24 PROCEDURE — 92012 INTRM OPH EXAM EST PATIENT: CPT | Mod: S$GLB,,, | Performed by: OPHTHALMOLOGY

## 2021-09-24 PROCEDURE — 92020 PR SPECIAL EYE EVAL,GONIOSCOPY: ICD-10-PCS | Mod: S$GLB,,, | Performed by: OPHTHALMOLOGY

## 2021-09-24 PROCEDURE — 92133 CPTRZD OPH DX IMG PST SGM ON: CPT | Mod: S$GLB,,, | Performed by: OPHTHALMOLOGY

## 2021-09-24 PROCEDURE — 1159F PR MEDICATION LIST DOCUMENTED IN MEDICAL RECORD: ICD-10-PCS | Mod: CPTII,S$GLB,, | Performed by: OPHTHALMOLOGY

## 2021-09-24 PROCEDURE — 92083 HUMPHREY VISUAL FIELD - OU - BOTH EYES: ICD-10-PCS | Mod: S$GLB,,, | Performed by: OPHTHALMOLOGY

## 2021-09-24 PROCEDURE — 3044F HG A1C LEVEL LT 7.0%: CPT | Mod: CPTII,S$GLB,, | Performed by: OPHTHALMOLOGY

## 2021-09-24 PROCEDURE — 76514 ULTRASOUND PACHYMETRY: ICD-10-PCS | Mod: S$GLB,,, | Performed by: OPHTHALMOLOGY

## 2021-09-24 PROCEDURE — 1160F RVW MEDS BY RX/DR IN RCRD: CPT | Mod: CPTII,S$GLB,, | Performed by: OPHTHALMOLOGY

## 2021-09-24 PROCEDURE — 3044F PR MOST RECENT HEMOGLOBIN A1C LEVEL <7.0%: ICD-10-PCS | Mod: CPTII,S$GLB,, | Performed by: OPHTHALMOLOGY

## 2021-09-24 PROCEDURE — 1159F MED LIST DOCD IN RCRD: CPT | Mod: CPTII,S$GLB,, | Performed by: OPHTHALMOLOGY

## 2021-09-24 RX ORDER — TIZANIDINE 4 MG/1
4 TABLET ORAL NIGHTLY PRN
COMMUNITY
Start: 2021-07-14 | End: 2021-11-16

## 2021-09-24 RX ORDER — METFORMIN HYDROCHLORIDE 500 MG/1
500 TABLET, EXTENDED RELEASE ORAL EVERY MORNING
COMMUNITY
Start: 2021-04-05 | End: 2021-11-16

## 2021-09-24 RX ORDER — LATANOPROST 50 UG/ML
1 SOLUTION/ DROPS OPHTHALMIC NIGHTLY
Qty: 2.5 ML | Refills: 11 | Status: SHIPPED | OUTPATIENT
Start: 2021-09-24 | End: 2023-02-08 | Stop reason: SDUPTHER

## 2021-09-30 ENCOUNTER — OFFICE VISIT (OUTPATIENT)
Dept: PODIATRY | Facility: CLINIC | Age: 61
End: 2021-09-30
Payer: COMMERCIAL

## 2021-09-30 DIAGNOSIS — M79.672 PAIN IN BOTH FEET: ICD-10-CM

## 2021-09-30 DIAGNOSIS — M79.671 PAIN IN BOTH FEET: ICD-10-CM

## 2021-09-30 PROCEDURE — 99203 PR OFFICE/OUTPT VISIT, NEW, LEVL III, 30-44 MIN: ICD-10-PCS | Mod: S$GLB,,, | Performed by: STUDENT IN AN ORGANIZED HEALTH CARE EDUCATION/TRAINING PROGRAM

## 2021-09-30 PROCEDURE — 1160F PR REVIEW ALL MEDS BY PRESCRIBER/CLIN PHARMACIST DOCUMENTED: ICD-10-PCS | Mod: CPTII,S$GLB,, | Performed by: STUDENT IN AN ORGANIZED HEALTH CARE EDUCATION/TRAINING PROGRAM

## 2021-09-30 PROCEDURE — 3044F HG A1C LEVEL LT 7.0%: CPT | Mod: CPTII,S$GLB,, | Performed by: STUDENT IN AN ORGANIZED HEALTH CARE EDUCATION/TRAINING PROGRAM

## 2021-09-30 PROCEDURE — 99999 PR PBB SHADOW E&M-EST. PATIENT-LVL III: ICD-10-PCS | Mod: PBBFAC,,, | Performed by: STUDENT IN AN ORGANIZED HEALTH CARE EDUCATION/TRAINING PROGRAM

## 2021-09-30 PROCEDURE — 99203 OFFICE O/P NEW LOW 30 MIN: CPT | Mod: S$GLB,,, | Performed by: STUDENT IN AN ORGANIZED HEALTH CARE EDUCATION/TRAINING PROGRAM

## 2021-09-30 PROCEDURE — 1160F RVW MEDS BY RX/DR IN RCRD: CPT | Mod: CPTII,S$GLB,, | Performed by: STUDENT IN AN ORGANIZED HEALTH CARE EDUCATION/TRAINING PROGRAM

## 2021-09-30 PROCEDURE — 99999 PR PBB SHADOW E&M-EST. PATIENT-LVL III: CPT | Mod: PBBFAC,,, | Performed by: STUDENT IN AN ORGANIZED HEALTH CARE EDUCATION/TRAINING PROGRAM

## 2021-09-30 PROCEDURE — 1159F PR MEDICATION LIST DOCUMENTED IN MEDICAL RECORD: ICD-10-PCS | Mod: CPTII,S$GLB,, | Performed by: STUDENT IN AN ORGANIZED HEALTH CARE EDUCATION/TRAINING PROGRAM

## 2021-09-30 PROCEDURE — 3044F PR MOST RECENT HEMOGLOBIN A1C LEVEL <7.0%: ICD-10-PCS | Mod: CPTII,S$GLB,, | Performed by: STUDENT IN AN ORGANIZED HEALTH CARE EDUCATION/TRAINING PROGRAM

## 2021-09-30 PROCEDURE — 1159F MED LIST DOCD IN RCRD: CPT | Mod: CPTII,S$GLB,, | Performed by: STUDENT IN AN ORGANIZED HEALTH CARE EDUCATION/TRAINING PROGRAM

## 2021-09-30 RX ORDER — METHYLPREDNISOLONE 4 MG/1
TABLET ORAL
Qty: 1 PACKAGE | Refills: 0 | Status: SHIPPED | OUTPATIENT
Start: 2021-09-30 | End: 2021-10-21

## 2021-10-20 ENCOUNTER — TELEPHONE (OUTPATIENT)
Dept: SPINE | Facility: CLINIC | Age: 61
End: 2021-10-20

## 2021-11-11 ENCOUNTER — OFFICE VISIT (OUTPATIENT)
Dept: PODIATRY | Facility: CLINIC | Age: 61
End: 2021-11-11
Payer: COMMERCIAL

## 2021-11-11 DIAGNOSIS — G57.53 TARSAL TUNNEL SYNDROME OF BOTH LOWER EXTREMITIES: ICD-10-CM

## 2021-11-11 DIAGNOSIS — M72.2 PLANTAR FASCIITIS: ICD-10-CM

## 2021-11-11 DIAGNOSIS — M79.671 PAIN IN BOTH FEET: ICD-10-CM

## 2021-11-11 DIAGNOSIS — M79.672 PAIN IN BOTH FEET: ICD-10-CM

## 2021-11-11 DIAGNOSIS — G60.9 IDIOPATHIC PERIPHERAL NEUROPATHY: Primary | ICD-10-CM

## 2021-11-11 PROCEDURE — 99213 PR OFFICE/OUTPT VISIT, EST, LEVL III, 20-29 MIN: ICD-10-PCS | Mod: S$GLB,,, | Performed by: STUDENT IN AN ORGANIZED HEALTH CARE EDUCATION/TRAINING PROGRAM

## 2021-11-11 PROCEDURE — 1159F PR MEDICATION LIST DOCUMENTED IN MEDICAL RECORD: ICD-10-PCS | Mod: CPTII,S$GLB,, | Performed by: STUDENT IN AN ORGANIZED HEALTH CARE EDUCATION/TRAINING PROGRAM

## 2021-11-11 PROCEDURE — 1160F PR REVIEW ALL MEDS BY PRESCRIBER/CLIN PHARMACIST DOCUMENTED: ICD-10-PCS | Mod: CPTII,S$GLB,, | Performed by: STUDENT IN AN ORGANIZED HEALTH CARE EDUCATION/TRAINING PROGRAM

## 2021-11-11 PROCEDURE — 99213 OFFICE O/P EST LOW 20 MIN: CPT | Mod: S$GLB,,, | Performed by: STUDENT IN AN ORGANIZED HEALTH CARE EDUCATION/TRAINING PROGRAM

## 2021-11-11 PROCEDURE — 99999 PR PBB SHADOW E&M-EST. PATIENT-LVL III: CPT | Mod: PBBFAC,,, | Performed by: STUDENT IN AN ORGANIZED HEALTH CARE EDUCATION/TRAINING PROGRAM

## 2021-11-11 PROCEDURE — 3044F PR MOST RECENT HEMOGLOBIN A1C LEVEL <7.0%: ICD-10-PCS | Mod: CPTII,S$GLB,, | Performed by: STUDENT IN AN ORGANIZED HEALTH CARE EDUCATION/TRAINING PROGRAM

## 2021-11-11 PROCEDURE — 1159F MED LIST DOCD IN RCRD: CPT | Mod: CPTII,S$GLB,, | Performed by: STUDENT IN AN ORGANIZED HEALTH CARE EDUCATION/TRAINING PROGRAM

## 2021-11-11 PROCEDURE — 3044F HG A1C LEVEL LT 7.0%: CPT | Mod: CPTII,S$GLB,, | Performed by: STUDENT IN AN ORGANIZED HEALTH CARE EDUCATION/TRAINING PROGRAM

## 2021-11-11 PROCEDURE — 1160F RVW MEDS BY RX/DR IN RCRD: CPT | Mod: CPTII,S$GLB,, | Performed by: STUDENT IN AN ORGANIZED HEALTH CARE EDUCATION/TRAINING PROGRAM

## 2021-11-11 PROCEDURE — 99999 PR PBB SHADOW E&M-EST. PATIENT-LVL III: ICD-10-PCS | Mod: PBBFAC,,, | Performed by: STUDENT IN AN ORGANIZED HEALTH CARE EDUCATION/TRAINING PROGRAM

## 2021-11-11 RX ORDER — METHYLPREDNISOLONE 4 MG/1
TABLET ORAL
Qty: 21 EACH | Refills: 0 | Status: SHIPPED | OUTPATIENT
Start: 2021-11-11 | End: 2021-12-02

## 2021-12-09 ENCOUNTER — OFFICE VISIT (OUTPATIENT)
Dept: PODIATRY | Facility: CLINIC | Age: 61
End: 2021-12-09
Payer: COMMERCIAL

## 2021-12-09 VITALS — HEIGHT: 65 IN | BODY MASS INDEX: 30.93 KG/M2 | WEIGHT: 185.63 LBS

## 2021-12-09 DIAGNOSIS — M79.671 PAIN IN BOTH FEET: ICD-10-CM

## 2021-12-09 DIAGNOSIS — G60.9 IDIOPATHIC PERIPHERAL NEUROPATHY: Primary | ICD-10-CM

## 2021-12-09 DIAGNOSIS — M79.672 PAIN IN BOTH FEET: ICD-10-CM

## 2021-12-09 DIAGNOSIS — M72.2 PLANTAR FASCIITIS: ICD-10-CM

## 2021-12-09 PROCEDURE — 99999 PR PBB SHADOW E&M-EST. PATIENT-LVL III: CPT | Mod: PBBFAC,,, | Performed by: STUDENT IN AN ORGANIZED HEALTH CARE EDUCATION/TRAINING PROGRAM

## 2021-12-09 PROCEDURE — 99999 PR PBB SHADOW E&M-EST. PATIENT-LVL III: ICD-10-PCS | Mod: PBBFAC,,, | Performed by: STUDENT IN AN ORGANIZED HEALTH CARE EDUCATION/TRAINING PROGRAM

## 2021-12-09 PROCEDURE — 99213 PR OFFICE/OUTPT VISIT, EST, LEVL III, 20-29 MIN: ICD-10-PCS | Mod: S$GLB,,, | Performed by: STUDENT IN AN ORGANIZED HEALTH CARE EDUCATION/TRAINING PROGRAM

## 2021-12-09 PROCEDURE — 99213 OFFICE O/P EST LOW 20 MIN: CPT | Mod: S$GLB,,, | Performed by: STUDENT IN AN ORGANIZED HEALTH CARE EDUCATION/TRAINING PROGRAM

## 2021-12-20 ENCOUNTER — OFFICE VISIT (OUTPATIENT)
Dept: PHYSICAL MEDICINE AND REHAB | Facility: CLINIC | Age: 61
End: 2021-12-20
Payer: COMMERCIAL

## 2021-12-20 VITALS — WEIGHT: 190.5 LBS | BODY MASS INDEX: 31.74 KG/M2 | HEIGHT: 65 IN

## 2021-12-20 DIAGNOSIS — R20.2 PARESTHESIAS: Primary | ICD-10-CM

## 2021-12-20 PROCEDURE — 99999 PR PBB SHADOW E&M-EST. PATIENT-LVL III: CPT | Mod: PBBFAC,,, | Performed by: PHYSICAL MEDICINE & REHABILITATION

## 2021-12-20 PROCEDURE — 99204 PR OFFICE/OUTPT VISIT, NEW, LEVL IV, 45-59 MIN: ICD-10-PCS | Mod: S$GLB,,, | Performed by: PHYSICAL MEDICINE & REHABILITATION

## 2021-12-20 PROCEDURE — 99999 PR PBB SHADOW E&M-EST. PATIENT-LVL III: ICD-10-PCS | Mod: PBBFAC,,, | Performed by: PHYSICAL MEDICINE & REHABILITATION

## 2021-12-20 PROCEDURE — 99204 OFFICE O/P NEW MOD 45 MIN: CPT | Mod: S$GLB,,, | Performed by: PHYSICAL MEDICINE & REHABILITATION

## 2022-01-13 ENCOUNTER — TELEPHONE (OUTPATIENT)
Dept: NEUROSURGERY | Facility: CLINIC | Age: 62
End: 2022-01-13
Payer: COMMERCIAL

## 2022-01-26 ENCOUNTER — OFFICE VISIT (OUTPATIENT)
Dept: NEUROSURGERY | Facility: CLINIC | Age: 62
End: 2022-01-26
Payer: COMMERCIAL

## 2022-01-26 VITALS
SYSTOLIC BLOOD PRESSURE: 128 MMHG | RESPIRATION RATE: 18 BRPM | HEART RATE: 79 BPM | WEIGHT: 188.94 LBS | BODY MASS INDEX: 31.48 KG/M2 | HEIGHT: 65 IN | DIASTOLIC BLOOD PRESSURE: 79 MMHG

## 2022-01-26 DIAGNOSIS — M50.30 DDD (DEGENERATIVE DISC DISEASE), CERVICAL: ICD-10-CM

## 2022-01-26 DIAGNOSIS — M54.12 CERVICAL RADICULAR PAIN: ICD-10-CM

## 2022-01-26 PROCEDURE — 3078F PR MOST RECENT DIASTOLIC BLOOD PRESSURE < 80 MM HG: ICD-10-PCS | Mod: CPTII,S$GLB,, | Performed by: PHYSICIAN ASSISTANT

## 2022-01-26 PROCEDURE — 1160F PR REVIEW ALL MEDS BY PRESCRIBER/CLIN PHARMACIST DOCUMENTED: ICD-10-PCS | Mod: CPTII,S$GLB,, | Performed by: PHYSICIAN ASSISTANT

## 2022-01-26 PROCEDURE — 1159F PR MEDICATION LIST DOCUMENTED IN MEDICAL RECORD: ICD-10-PCS | Mod: CPTII,S$GLB,, | Performed by: PHYSICIAN ASSISTANT

## 2022-01-26 PROCEDURE — 1159F MED LIST DOCD IN RCRD: CPT | Mod: CPTII,S$GLB,, | Performed by: PHYSICIAN ASSISTANT

## 2022-01-26 PROCEDURE — 3078F DIAST BP <80 MM HG: CPT | Mod: CPTII,S$GLB,, | Performed by: PHYSICIAN ASSISTANT

## 2022-01-26 PROCEDURE — 3008F BODY MASS INDEX DOCD: CPT | Mod: CPTII,S$GLB,, | Performed by: PHYSICIAN ASSISTANT

## 2022-01-26 PROCEDURE — 99214 OFFICE O/P EST MOD 30 MIN: CPT | Mod: S$GLB,,, | Performed by: PHYSICIAN ASSISTANT

## 2022-01-26 PROCEDURE — 99214 PR OFFICE/OUTPT VISIT, EST, LEVL IV, 30-39 MIN: ICD-10-PCS | Mod: S$GLB,,, | Performed by: PHYSICIAN ASSISTANT

## 2022-01-26 PROCEDURE — 3008F PR BODY MASS INDEX (BMI) DOCUMENTED: ICD-10-PCS | Mod: CPTII,S$GLB,, | Performed by: PHYSICIAN ASSISTANT

## 2022-01-26 PROCEDURE — 1160F RVW MEDS BY RX/DR IN RCRD: CPT | Mod: CPTII,S$GLB,, | Performed by: PHYSICIAN ASSISTANT

## 2022-01-26 PROCEDURE — 3074F SYST BP LT 130 MM HG: CPT | Mod: CPTII,S$GLB,, | Performed by: PHYSICIAN ASSISTANT

## 2022-01-26 PROCEDURE — 3074F PR MOST RECENT SYSTOLIC BLOOD PRESSURE < 130 MM HG: ICD-10-PCS | Mod: CPTII,S$GLB,, | Performed by: PHYSICIAN ASSISTANT

## 2022-01-26 NOTE — PROGRESS NOTES
[facility] - Neurosurgery  Clinic Consult     Consult Requested By: Mitch Alexander II, *  PCP: Micheline Sears MD    SUBJECTIVE:     Chief Complaint:   Chief Complaint   Patient presents with    Neck Pain     Patient presents to clinic with c/o neck pain x years.  Patient reports her neck pain is an aching feeling that extends into the R shoulder blade with numbness in her R arm, but denies any tingling.  She also reports BL foot numbness that is worse when she lays down. Patient reports getting headaches often in the back of her head with a 7/10 pain level.  She does report blurry vision in her R eye that comes and goes.       History of Present Illness:  Laura Aquino is a 61 y.o. right handed female with HTN, HLD, DM who presents for evaluation of neck pain. She reports the pain is present in the posterior neck and radiates into the medial right shoulder blade. She reports numbness in bilateral hands and does drop objects occasionally. She has known disc degeneration with stenosis of the cervical spine. She has attended PT in the past without significant improvement. She has not received injections with pain management. She has numbness in her feet and is scheduled for EMG to investigate this.     Pertinent and recent history, provider evaluations, imaging and data reviewed in EPIC    Past Medical History:   Diagnosis Date    Abdominal wall mass     Abnormal results of liver function studies     AD (atopic dermatitis)     Diabetes mellitus     Diverticulosis     Hepatic steatosis     Hyperlipidemia     Hypertension     Open angle glaucoma suspect with borderline findings at low risk     Precordial pain 10/2019    Skin sensation disturbance      Past Surgical History:   Procedure Laterality Date    ANGIOGRAM, CORONARY, WITH LEFT HEART CATHETERIZATION N/A 10/14/2019    Procedure: Angiogram, Coronary, with Left Heart Cath;  Surgeon: Franki Carroll MD;  Location: [facility] CATH;   Service: Cardiology;  Laterality: N/A;    COLONOSCOPY  2020    Dr. Lopez, in procedures: diverticulosis, fair prep; repeat in 5 years for screening    ESOPHAGOGASTRODUODENOSCOPY N/A 2020    Procedure: EGD (ESOPHAGOGASTRODUODENOSCOPY);  Surgeon: Hermilo Quispe Jr., MD;  Location: [facility] ENDO;  Service: Endoscopy;  Laterality: N/A;    HYSTERECTOMY      Complete    LEFT HEART CATHETERIZATION Left 10/14/2019    Procedure: Left heart cath;  Surgeon: Franki Carroll MD;  Location: [facility] CATH;  Service: Cardiology;  Laterality: Left;    LIPOMA RESECTION      TONSILLECTOMY Bilateral 2020    Procedure: TONSILLECTOMY;  Surgeon: Pk Kirkpatrick MD;  Location[facility];  Service: ENT;  Laterality: Bilateral;    TOTAL ABDOMINAL HYSTERECTOMY W/ BILATERAL SALPINGOOPHORECTOMY Bilateral     w/ hysterectomy    TUBAL LIGATION       Family History   Problem Relation Age of Onset    Asthma Mother     Diabetes Mother     Cataracts Mother     Anxiety disorder Sister     Anxiety disorder Brother     Diabetes Father     Sarcoidosis Daughter     HIV Son     No Known Problems Son     No Known Problems Son     Colon cancer Neg Hx     Colon polyps Neg Hx     Crohn's disease Neg Hx     Ulcerative colitis Neg Hx     Stomach cancer Neg Hx     Esophageal cancer Neg Hx     Glaucoma Neg Hx     Macular degeneration Neg Hx     Retinal detachment Neg Hx      Social History     Tobacco Use    Smoking status: Former Smoker     Packs/day: 1.00     Years: 5.00     Pack years: 5.00     Types: Cigarettes     Quit date: 10/1/1993     Years since quittin.3    Smokeless tobacco: Never Used   Substance Use Topics    Alcohol use: No     Alcohol/week: 0.0 standard drinks    Drug use: No      Review of patient's allergies indicates:  No Known Allergies    Current Outpatient Medications:     ALPRAZolam (XANAX) 1 MG tablet, Take 1 tablet (1 mg total) by mouth daily as needed for Anxiety., Disp: 30  tablet, Rfl: 0    amLODIPine (NORVASC) 5 MG tablet, Take 1 tablet (5 mg total) by mouth once daily., Disp: 90 tablet, Rfl: 1    atorvastatin (LIPITOR) 40 MG tablet, Take 1 tablet (40 mg total) by mouth once daily., Disp: 90 tablet, Rfl: 4    azelastine (ASTELIN) 137 mcg (0.1 %) nasal spray, USE 2 SPRAY(S) IN EACH NOSTRIL TWICE DAILY, Disp: , Rfl:     cholecalciferol, vitamin D3, (VITAMIN D3) 25 mcg (1,000 unit) capsule, Take 2 capsules (2,000 Units total) by mouth once daily., Disp: 90 capsule, Rfl: 3    lactobacillus combination no.8 (ADULT PROBIOTIC ORAL), Take by mouth once daily., Disp: , Rfl:     latanoprost 0.005 % ophthalmic solution, Place 1 drop into both eyes every evening., Disp: 2.5 mL, Rfl: 11    magnesium oxide (MAG-OX) 400 mg (241.3 mg magnesium) tablet, Take 1 tablet (400 mg total) by mouth once daily., Disp: , Rfl: 0    mv-mn/iron/folic acid/herb 190 (VITAMIN D3 COMPLETE ORAL), Take by mouth once daily., Disp: , Rfl:     nitroGLYCERIN (NITROSTAT) 0.4 MG SL tablet, Place 1 tablet (0.4 mg total) under the tongue every 5 (five) minutes as needed for Chest pain., Disp: 100 tablet, Rfl: 0    Review of Systems:   Constitutional: no fever, chills or night sweats. No changes in weight   Eyes: no visual changes   ENT: no nasal congestion or sore throat   Respiratory: no cough or shortness of breath   Cardiovascular: no chest pain or palpitations   Gastrointestinal: no nausea or vomiting   Genitourinary: no hematuria or dysuria   Integument/Breast: no rash or pruritis   Hematologic/Lymphatic: no easy bruising or lymphadenopathy   Musculoskeletal: +neck pain   Neurological: no seizures or tremors +numbness    Behavioral/Psych: no auditory or visual hallucinations   Endocrine: no heat or cold intolerance         OBJECTIVE:     Vital Signs (Most Recent):  Pulse: 79 (01/26/22 1430)  Resp: 18 (01/26/22 1430)  BP: 128/79 (01/26/22 1430)  Estimated body mass index is 31.44 kg/m² as calculated from the  "following:    Height as of this encounter: 5' 5" (1.651 m).    Weight as of this encounter: 85.7 kg (188 lb 15 oz).    Physical Exam:   General: well developed, well nourished, no distress.   Neurologic: Alert and oriented. Thought content appropriate. GCS 15.   Language: No aphasia  Speech: No dysarthria  Head: normocephalic, atraumatic  Eyes: EOMI.  Neck: trachea midline, no JVD   Cardiovascular: no LE edema  Pulmonary: normal respirations, no signs of respiratory distress  Abdomen: non-distended  Sensory: intact to light touch throughout  Skin: Skin is warm, dry and intact.    Motor Strength: Moves all extremities spontaneously with good tone. No abnormal movements seen.     Strength  Deltoids Triceps Biceps Wrist Extension Wrist Flexion Hand  Interossei   Upper: R 5/5 5/5 5/5 5/5 5/5 5/5 5/5    L 5/5 5/5 5/5 5/5 5/5 5/5 5/5     Iliopsoas Quadriceps Knee  Flexion Tibialis  anterior Gastro- cnemius EHL    Lower: R 5/5 5/5 5/5 5/5 5/5 5/5     L 5/5 5/5 5/5 5/5 5/5 5/5      DTR's: 2 +   White: absent  Clonus: absent     Gait: normal    Tandem Gait: No difficulty           Cervical Spine: full ROM, no TTP, negative Spurling's           Diagnostic Results:  I have independently reviewed the following imaging:  XR cervical spine 2022 C5-6 C6-7 disc degeneration appears worse compared to MRI 2019, straightening of lordosis  MRI cervical spine 2019 C5-6 C6-7 disc degeneration with mild bulging and stenosis    ASSESSMENT/PLAN:     DDD (degenerative disc disease), cervical  -     Ambulatory referral/consult to Neurosurgery  -     Ambulatory referral/consult to Pain Clinic; Future; Expected date: 02/02/2022  -     MRI Cervical Spine Without Contrast; Future; Expected date: 01/26/2022    Cervical radicular pain  -     Ambulatory referral/consult to Neurosurgery  -     Ambulatory referral/consult to Pain Clinic; Future; Expected date: 02/02/2022  -     MRI Cervical Spine Without Contrast; Future; Expected date: " 01/26/2022        Laura Aquino is a 61 y.o. female with neck pain, pain around the right scapula, and hand numbness. She is interested in trying injections with pain management. I have ordered MRI cervical spine to assess progression of degeneration and stenosis. I will review once complete and call patient with results. I have placed a referral to pain management to discuss cervical spine injections.     Patient verbalized understanding of plan. Encouraged to call with any questions or concerns.

## 2022-02-08 ENCOUNTER — OFFICE VISIT (OUTPATIENT)
Dept: PHYSICAL MEDICINE AND REHAB | Facility: CLINIC | Age: 62
End: 2022-02-08
Payer: COMMERCIAL

## 2022-02-08 ENCOUNTER — TELEPHONE (OUTPATIENT)
Dept: PHYSICAL MEDICINE AND REHAB | Facility: CLINIC | Age: 62
End: 2022-02-08

## 2022-02-08 VITALS — HEIGHT: 66 IN | BODY MASS INDEX: 30.36 KG/M2 | WEIGHT: 188.94 LBS

## 2022-02-08 DIAGNOSIS — M54.16 LUMBAR RADICULOPATHY, CHRONIC: ICD-10-CM

## 2022-02-08 DIAGNOSIS — R20.2 PARESTHESIAS: ICD-10-CM

## 2022-02-08 PROCEDURE — 95886 PR EMG COMPLETE, W/ NERVE CONDUCTION STUDIES, 5+ MUSCLES: ICD-10-PCS | Mod: S$GLB,,, | Performed by: PHYSICAL MEDICINE & REHABILITATION

## 2022-02-08 PROCEDURE — 99499 NO LOS: ICD-10-PCS | Mod: S$GLB,,, | Performed by: PHYSICAL MEDICINE & REHABILITATION

## 2022-02-08 PROCEDURE — 95886 MUSC TEST DONE W/N TEST COMP: CPT | Mod: S$GLB,,, | Performed by: PHYSICAL MEDICINE & REHABILITATION

## 2022-02-08 PROCEDURE — 99499 UNLISTED E&M SERVICE: CPT | Mod: S$GLB,,, | Performed by: PHYSICAL MEDICINE & REHABILITATION

## 2022-02-08 PROCEDURE — 95909 PR NERVE CONDUCTION STUDY; 5-6 STUDIES: ICD-10-PCS | Mod: S$GLB,,, | Performed by: PHYSICAL MEDICINE & REHABILITATION

## 2022-02-08 PROCEDURE — 99999 PR PBB SHADOW E&M-EST. PATIENT-LVL III: CPT | Mod: PBBFAC,,, | Performed by: PHYSICAL MEDICINE & REHABILITATION

## 2022-02-08 PROCEDURE — 1160F PR REVIEW ALL MEDS BY PRESCRIBER/CLIN PHARMACIST DOCUMENTED: ICD-10-PCS | Mod: CPTII,S$GLB,, | Performed by: PHYSICAL MEDICINE & REHABILITATION

## 2022-02-08 PROCEDURE — 95909 NRV CNDJ TST 5-6 STUDIES: CPT | Mod: S$GLB,,, | Performed by: PHYSICAL MEDICINE & REHABILITATION

## 2022-02-08 PROCEDURE — 3008F PR BODY MASS INDEX (BMI) DOCUMENTED: ICD-10-PCS | Mod: CPTII,S$GLB,, | Performed by: PHYSICAL MEDICINE & REHABILITATION

## 2022-02-08 PROCEDURE — 99999 PR PBB SHADOW E&M-EST. PATIENT-LVL III: ICD-10-PCS | Mod: PBBFAC,,, | Performed by: PHYSICAL MEDICINE & REHABILITATION

## 2022-02-08 PROCEDURE — 1160F RVW MEDS BY RX/DR IN RCRD: CPT | Mod: CPTII,S$GLB,, | Performed by: PHYSICAL MEDICINE & REHABILITATION

## 2022-02-08 PROCEDURE — 1159F PR MEDICATION LIST DOCUMENTED IN MEDICAL RECORD: ICD-10-PCS | Mod: CPTII,S$GLB,, | Performed by: PHYSICAL MEDICINE & REHABILITATION

## 2022-02-08 PROCEDURE — 3008F BODY MASS INDEX DOCD: CPT | Mod: CPTII,S$GLB,, | Performed by: PHYSICAL MEDICINE & REHABILITATION

## 2022-02-08 PROCEDURE — 1159F MED LIST DOCD IN RCRD: CPT | Mod: CPTII,S$GLB,, | Performed by: PHYSICAL MEDICINE & REHABILITATION

## 2022-02-08 NOTE — PROGRESS NOTES
[facility]  [address] [place] [address]             Full Name: Laura Aquino Patient ID: [id number]  History: Pt c/o bilateral foot numbness and low back pain.      Visit Date: 2/8/2022 12:15 PM  Examining Physician: Ramos Xie MD      Sensory NCS      Nerve / Sites Rec. Site Onset Lat Peak Lat NP Amp PP Amp Segments Distance Velocity     ms ms µV µV  cm m/s   L Sural - Ankle (Calf)      Calf Ankle 3.23 3.75 7.1 1.3 Calf - Ankle 14 43   R Sural - Ankle (Calf)      Calf Ankle 3.44 4.19 11.6 5.0 Calf - Ankle 14 41       Motor NCS      Nerve / Sites Muscle Latency Amplitude Amp % Duration Segments Distance Lat Diff Velocity     ms mV % ms  cm ms m/s   L Peroneal - EDB      Ankle EDB 5.58 2.2 100 7.71 Ankle - EDB 8        Fib head EDB 12.44 1.9 86.5 8.65 Fib head - Ankle 27 6.85 39   R Peroneal - EDB      Ankle EDB 4.00 2.4 100 8.06 Ankle - EDB 8        Fib head EDB 11.67 2.1 88.3 9.33 Fib head - Ankle 31 7.67 40   L Tibial - AH      Ankle AH 5.98 13.0 100 4.90 Ankle - AH 8        Pop fossa AH 14.08 7.9 60.9 6.88 Pop fossa - Ankle 39 8.10 48   R Tibial - AH      Ankle AH 6.00 9.9 100 5.21 Ankle - AH 8        Pop fossa AH 13.73 6.3 63.6  Pop fossa - Ankle 38 7.73 49       EMG Summary Table     Spontaneous MUAP Recruitment   Muscle IA Fib PSW Fasc CRD Amp Dur. PPP Pattern   R. Vastus medialis N None None None None N N N N   R. Tibialis anterior N None None None None N N N N   R. Peroneus longus N None None None None N N N N   R. Gastrocnemius (Medial head) 1+ 1+ 1+ None None N N N N   R. Gluteus cesar N None None None None       R. Lumbar paraspinals 1+ None 1+ None None           Summary    The motor conduction test was normal in all 4 of the tested nerves: L Peroneal - EDB, R Peroneal - EDB, L Tibial - AH, R Tibial - AH.    The sensory conduction test was normal in all 2 of the tested nerves: L Sural - Ankle (Calf), R Sural - Ankle (Calf).    The needle EMG examination was performed in 6  muscles. It was normal in 4 muscle(s): R. Vastus medialis, R. Tibialis anterior, R. Peroneus longus, R. Gluteus cesar. The study was abnormal in 2 muscle(s), with the following distribution:   Abnormal spontaneous/insertional activity was found in R. Gastrocnemius (Medial head), R. Lumbar paraspinals.      Electrodiagnostic Impression:  1. There is electrodiagnostic evidence suggestive of active right S1 radiculopathy.  2. There was insufficient electrodiagnostic evidence for diagnoses of peripheral polyneuropathy, or myopathic process of the right lower extremity.    Plan:  She does report a chronic history of low back pain.  We have a prior MRI from 5 years ago, therefore it would be appropriate to obtain updated imaging of the lumbosacral spine.  We will consider referral for interventional injections and physical therapy pending the results.    Thank you very much for the referral. Please call if you have any questions regarding this study or the report.       ___________________________  Ramos Xie M.D.

## 2022-02-19 ENCOUNTER — HOSPITAL ENCOUNTER (OUTPATIENT)
Dept: RADIOLOGY | Facility: HOSPITAL | Age: 62
Discharge: HOME | End: 2022-02-19
Attending: PHYSICAL MEDICINE & REHABILITATION
Payer: COMMERCIAL

## 2022-02-19 DIAGNOSIS — M54.16 LUMBAR RADICULOPATHY, CHRONIC: ICD-10-CM

## 2022-02-19 PROCEDURE — 72148 MRI LUMBAR SPINE W/O DYE: CPT | Mod: 26,,, | Performed by: RADIOLOGY

## 2022-02-19 PROCEDURE — 72148 MRI LUMBAR SPINE W/O DYE: CPT | Mod: TC,PO

## 2022-02-19 PROCEDURE — 72148 MRI LUMBAR SPINE WITHOUT CONTRAST: ICD-10-PCS | Mod: 26,,, | Performed by: RADIOLOGY

## 2022-02-22 ENCOUNTER — TELEPHONE (OUTPATIENT)
Dept: PHYSICAL MEDICINE AND REHAB | Facility: CLINIC | Age: 62
End: 2022-02-22
Payer: COMMERCIAL

## 2022-02-22 NOTE — TELEPHONE ENCOUNTER
----- Message from Ramos Xie MD sent at 2/21/2022  5:18 PM CST -----  Please let her know that the MRI showed arthritis and bulging discs.  It looks like there may be some discs that her pushing on the nerve going to the right leg.  I recommend we start her in physical therapy for lumbar radiculopathy and get her set up to see 1 of the interventional pain doctors.

## 2022-04-08 ENCOUNTER — OFFICE VISIT (OUTPATIENT)
Dept: PAIN MEDICINE | Facility: CLINIC | Age: 62
End: 2022-04-08
Payer: COMMERCIAL

## 2022-04-08 VITALS
BODY MASS INDEX: 31.39 KG/M2 | HEART RATE: 74 BPM | SYSTOLIC BLOOD PRESSURE: 136 MMHG | HEIGHT: 66 IN | WEIGHT: 195.31 LBS | DIASTOLIC BLOOD PRESSURE: 63 MMHG

## 2022-04-08 DIAGNOSIS — M47.812 CERVICAL SPONDYLOSIS: Primary | ICD-10-CM

## 2022-04-08 DIAGNOSIS — M54.12 CERVICAL RADICULOPATHY: ICD-10-CM

## 2022-04-08 DIAGNOSIS — M50.30 DDD (DEGENERATIVE DISC DISEASE), CERVICAL: ICD-10-CM

## 2022-04-08 PROCEDURE — 3075F SYST BP GE 130 - 139MM HG: CPT | Mod: CPTII,S$GLB,, | Performed by: ANESTHESIOLOGY

## 2022-04-08 PROCEDURE — 99999 PR PBB SHADOW E&M-EST. PATIENT-LVL IV: CPT | Mod: PBBFAC,,, | Performed by: ANESTHESIOLOGY

## 2022-04-08 PROCEDURE — 3078F PR MOST RECENT DIASTOLIC BLOOD PRESSURE < 80 MM HG: ICD-10-PCS | Mod: CPTII,S$GLB,, | Performed by: ANESTHESIOLOGY

## 2022-04-08 PROCEDURE — 1160F PR REVIEW ALL MEDS BY PRESCRIBER/CLIN PHARMACIST DOCUMENTED: ICD-10-PCS | Mod: CPTII,S$GLB,, | Performed by: ANESTHESIOLOGY

## 2022-04-08 PROCEDURE — 1159F MED LIST DOCD IN RCRD: CPT | Mod: CPTII,S$GLB,, | Performed by: ANESTHESIOLOGY

## 2022-04-08 PROCEDURE — 1159F PR MEDICATION LIST DOCUMENTED IN MEDICAL RECORD: ICD-10-PCS | Mod: CPTII,S$GLB,, | Performed by: ANESTHESIOLOGY

## 2022-04-08 PROCEDURE — 3008F BODY MASS INDEX DOCD: CPT | Mod: CPTII,S$GLB,, | Performed by: ANESTHESIOLOGY

## 2022-04-08 PROCEDURE — 3075F PR MOST RECENT SYSTOLIC BLOOD PRESS GE 130-139MM HG: ICD-10-PCS | Mod: CPTII,S$GLB,, | Performed by: ANESTHESIOLOGY

## 2022-04-08 PROCEDURE — 99999 PR PBB SHADOW E&M-EST. PATIENT-LVL IV: ICD-10-PCS | Mod: PBBFAC,,, | Performed by: ANESTHESIOLOGY

## 2022-04-08 PROCEDURE — 3008F PR BODY MASS INDEX (BMI) DOCUMENTED: ICD-10-PCS | Mod: CPTII,S$GLB,, | Performed by: ANESTHESIOLOGY

## 2022-04-08 PROCEDURE — 1160F RVW MEDS BY RX/DR IN RCRD: CPT | Mod: CPTII,S$GLB,, | Performed by: ANESTHESIOLOGY

## 2022-04-08 PROCEDURE — 99204 OFFICE O/P NEW MOD 45 MIN: CPT | Mod: S$GLB,,, | Performed by: ANESTHESIOLOGY

## 2022-04-08 PROCEDURE — 3078F DIAST BP <80 MM HG: CPT | Mod: CPTII,S$GLB,, | Performed by: ANESTHESIOLOGY

## 2022-04-08 PROCEDURE — 99204 PR OFFICE/OUTPT VISIT, NEW, LEVL IV, 45-59 MIN: ICD-10-PCS | Mod: S$GLB,,, | Performed by: ANESTHESIOLOGY

## 2022-04-08 NOTE — PROGRESS NOTES
[facility] New Patient Evaluation    Referred by: Karen Wade PA-C  Reason for referral: neck pain    CC:   Chief Complaint   Patient presents with    Back Pain    Neck Pain      Last 3 PDI Scores 4/8/2022   Pain Disability Index (PDI) 37       HPI:   Laura Aquino is a 61 y.o. female who presents with neck pain.  Pain is constant, sharp, aching with radiation to the bilateral shoulders.  She denies any numbness or weakness.    History:    Current Outpatient Medications:     ALPRAZolam (XANAX) 1 MG tablet, Take 1 tablet (1 mg total) by mouth daily as needed for Anxiety., Disp: 30 tablet, Rfl: 0    amLODIPine (NORVASC) 5 MG tablet, Take 1 tablet (5 mg total) by mouth once daily., Disp: 90 tablet, Rfl: 1    atorvastatin (LIPITOR) 40 MG tablet, Take 1 tablet (40 mg total) by mouth once daily., Disp: 90 tablet, Rfl: 4    azelastine (ASTELIN) 137 mcg (0.1 %) nasal spray, USE 2 SPRAY(S) IN EACH NOSTRIL TWICE DAILY, Disp: , Rfl:     cholecalciferol, vitamin D3, (VITAMIN D3) 25 mcg (1,000 unit) capsule, Take 2 capsules (2,000 Units total) by mouth once daily., Disp: 90 capsule, Rfl: 3    lactobacillus combination no.8 (ADULT PROBIOTIC ORAL), Take by mouth once daily., Disp: , Rfl:     latanoprost 0.005 % ophthalmic solution, Place 1 drop into both eyes every evening., Disp: 2.5 mL, Rfl: 11    magnesium oxide (MAG-OX) 400 mg (241.3 mg magnesium) tablet, Take 1 tablet (400 mg total) by mouth once daily., Disp: , Rfl: 0    mv-mn/iron/folic acid/herb 190 (VITAMIN D3 COMPLETE ORAL), Take by mouth once daily., Disp: , Rfl:     nitroGLYCERIN (NITROSTAT) 0.4 MG SL tablet, Place 1 tablet (0.4 mg total) under the tongue every 5 (five) minutes as needed for Chest pain., Disp: 100 tablet, Rfl: 0    Past Medical History:   Diagnosis Date    Abdominal wall mass     Abnormal results of liver function studies     AD (atopic dermatitis)     Diabetes mellitus     Diverticulosis     Hepatic steatosis      Hyperlipidemia     Hypertension     Open angle glaucoma suspect with borderline findings at low risk     Precordial pain 10/2019    Skin sensation disturbance        Past Surgical History:   Procedure Laterality Date    ANGIOGRAM, CORONARY, WITH LEFT HEART CATHETERIZATION N/A 10/14/2019    Procedure: Angiogram, Coronary, with Left Heart Cath;  Surgeon: Franki Carroll MD;  Location: [facility] CATH;  Service: Cardiology;  Laterality: N/A;    COLONOSCOPY  08/12/2020    Dr. Lopez, in procedures: diverticulosis, fair prep; repeat in 5 years for screening    ESOPHAGOGASTRODUODENOSCOPY N/A 11/6/2020    Procedure: EGD (ESOPHAGOGASTRODUODENOSCOPY);  Surgeon: Hermilo Quispe Jr., MD;  Location: [facility] ENDO;  Service: Endoscopy;  Laterality: N/A;    HYSTERECTOMY  1999    Complete    LEFT HEART CATHETERIZATION Left 10/14/2019    Procedure: Left heart cath;  Surgeon: Franki Carroll MD;  Location: [facility] CATH;  Service: Cardiology;  Laterality: Left;    LIPOMA RESECTION      TONSILLECTOMY Bilateral 9/16/2020    Procedure: TONSILLECTOMY;  Surgeon: Pk Kirkpatrick MD;  Location: [facility];  Service: ENT;  Laterality: Bilateral;    TOTAL ABDOMINAL HYSTERECTOMY W/ BILATERAL SALPINGOOPHORECTOMY Bilateral 1999    w/ hysterectomy    TUBAL LIGATION         Family History   Problem Relation Age of Onset    Asthma Mother     Diabetes Mother     Cataracts Mother     Anxiety disorder Sister     Anxiety disorder Brother     Diabetes Father     Sarcoidosis Daughter     HIV Son     No Known Problems Son     No Known Problems Son     Colon cancer Neg Hx     Colon polyps Neg Hx     Crohn's disease Neg Hx     Ulcerative colitis Neg Hx     Stomach cancer Neg Hx     Esophageal cancer Neg Hx     Glaucoma Neg Hx     Macular degeneration Neg Hx     Retinal detachment Neg Hx        Social History     Socioeconomic History    Marital status:    Tobacco Use    Smoking status: Former Smoker     Packs/day: 1.00      "Years: 5.00     Pack years: 5.00     Types: Cigarettes     Quit date: 10/1/1993     Years since quittin.5    Smokeless tobacco: Never Used   Substance and Sexual Activity    Alcohol use: No     Alcohol/week: 0.0 standard drinks    Drug use: No       Review of patient's allergies indicates:  No Known Allergies    Review of Systems:  General ROS: negative for - fever  Psychological ROS: negative for - hostility  Hematological and Lymphatic ROS: negative for - bleeding problems  Endocrine ROS: negative for - unexpected weight changes  Respiratory ROS: no cough, shortness of breath, or wheezing  Cardiovascular ROS: no chest pain or dyspnea on exertion  Gastrointestinal ROS: no abdominal pain, change in bowel habits, or black or bloody stools  Musculoskeletal ROS: negative for - muscular weakness  Neurological ROS: negative for - numbness/tingling  Dermatological ROS: negative for rash    Physical Exam:  Vitals:    22 1442   BP: 136/63   Pulse: 74   Weight: 88.6 kg (195 lb 5.2 oz)   Height: 5' 6" (1.676 m)   PainSc:   3   PainLoc: Neck     Body mass index is 31.53 kg/m².    Gen: NAD  Psych: mood appropriate for given condition  CV: 2+ radial pulse  HEENT: anicteric   Respiratory: non labored  Abd: soft nt, nd  Skin: intact  Sensation: intact to lt touch bilaterally in c4-t1   Reflexes: 2+ b/l Bicep, tricep, BR and patella White negative  ROM: Cervical ROM full, shoulder, elbow and wrist ROM full  Tone:  Normal at elbow, wrist and shoulder   Inspection: no atrophy of bicep, FDI or APB noted  Special tests: + b/l axial facet loading  Palpation: tender cervical paraspinals, levator scapula and trapezius    Motor:    Right Left   C4 Shoulder Abduction  5  5   C5 Elbow Flexion    5  5   C6 Wrist Extension  5  5   C7 Elbow Extension   5  5   C8/T1 Hand Intrinsics   5  5                 Imaging:  Xray cervical spine 22  FINDINGS:  There are mild degenerative changes at C5-6 and C6-7.  The AP alignment is " within normal limits.  The intervertebral foramina shows mild narrowing of the 7th nerve root level on the right the odontoid is intact.    MRI cervical spine 11/12/19  FINDINGS:  No STIR weighted signal abnormality was noted to suggest an aggressive bone marrow replacement process or recent fracture.  Vertebral body alignment appears adequate.  There is loss the normal cervical lordosis.  Intervertebral disc heights appear well preserved.  Intervertebral disc height loss is noted at the C5-C6 and C6-C7 levels.     At the C2-C3 level, mild disc osteophyte spurring is noted of 2 mm, no significant neural foraminal narrowing is noted.  Minimal central canal narrowing is noted.  At the C3-C4 level, mild disc osteophyte spurring is noted of approximately 2 mm.  Facet arthropathy is noted left slightly greater than right.  Minimal bilateral neural foraminal narrowing is suspected along with minimal central canal narrowing to approximately 11 mm.  At the C4-C5 level, uncovertebral spurring is noted towards the far lateral right and right neural foramen of 3 mm greater than to the left.  Facet arthropathy is noted bilaterally.  Mild right neural foraminal narrowing is noted with minimal left neural foraminal narrowing.  Minimal central canal narrowing is noted.  At the C5-C6 level, disc osteophyte broad-based bulge or protrusion is noted of 4 mm.  Facet arthropathy is noted bilaterally.  Uncovertebral spurring is noted to the left greater than right that may relate to a broad base protrusion far laterally and towards the left neural foramen of 3-4 mm..  Mild right neural foraminal stenosis is noted.  Moderate left neural foraminal stenosis is noted with possible nerve root contact.  Anterior cord contact may be present.  Mild to moderate thecal sac narrowing may be noted to 8 mm.  Fluid appears to be present posterior to the cord.  At the C6-C7 level, broad-based disc osteophyte bulge or protrusion is noted centrally of 3  mm with uncovertebral spurring towards each neural foramen.  Disc osteophyte bulge is noted towards each neural foramen of 3-4 mm.  Moderate bilateral neural foraminal narrowing is noted.  Mild thecal sac narrowing is noted to 9 mm.  Contact of the exiting nerve roots bilaterally may be present.  At the C7-T1 level, no significant disc bulge, central canal stenosis, or neural foraminal stenosis is noted.    Labs:  BMP  Lab Results   Component Value Date     11/30/2021    K 4.2 11/30/2021     11/30/2021    CO2 30 11/30/2021    BUN 13 11/30/2021    CREATININE 0.94 11/30/2021    CALCIUM 9.7 11/30/2021    ANIONGAP 4 (L) 11/30/2021    ESTGFRAFRICA >60 11/30/2021    EGFRNONAA >60 11/30/2021     Lab Results   Component Value Date    ALT 22 12/09/2021    AST 46 (H) 12/09/2021    ALKPHOS 68 12/09/2021    BILITOT 0.5 12/09/2021       Assessment:   Problem List Items Addressed This Visit    None     Visit Diagnoses     Cervical spondylosis    -  Primary    DDD (degenerative disc disease), cervical        Cervical radiculopathy              61 y.o. year old female with PMH HTN, THERESA who presents with neck pain.  Pain is constant, sharp, aching with radiation to the bilateral shoulders.  She denies any numbness or weakness.    - on exam she has full strength and intact sensation to light touch.  0 bilateral biceps, triceps, patellar DTR.  Ally's negative.  Pain with bilateral axial facet loading  - independently reviewed her cervical MRI from 2019 is as multilevel bilateral degenerative changes and disc osteophyte complex resulting in mild-to-moderate central canal narrowing at C5-6  - x-ray lumbar spine consistent with mild degenerative changes at C5-6 and C6-7  - she reports she has completed formal physical therapy in the past and has been maintaining home exercise program for the past 8 weeks without any significant relief  - she has a cervical MRI ordered but has not gone yet.  All help her get schedule for  this MRI to better evaluate her neural anatomy  - we will call her once imaging complete to discuss further treatment options    : Not applicable    Rashaun Stack M.D.  Interventional Pain Medicine / Anesthesiology    This note was completed with dictation software and grammatical errors may exist.

## 2022-04-22 ENCOUNTER — PATIENT MESSAGE (OUTPATIENT)
Dept: NEUROSURGERY | Facility: CLINIC | Age: 62
End: 2022-04-22
Payer: COMMERCIAL

## 2022-04-22 DIAGNOSIS — M50.30 DDD (DEGENERATIVE DISC DISEASE), CERVICAL: ICD-10-CM

## 2022-04-22 DIAGNOSIS — M54.12 CERVICAL RADICULAR PAIN: Primary | ICD-10-CM

## 2022-06-20 ENCOUNTER — PROCEDURE VISIT (OUTPATIENT)
Dept: NEUROLOGY | Facility: CLINIC | Age: 62
End: 2022-06-20
Payer: COMMERCIAL

## 2022-06-20 DIAGNOSIS — G56.22 ULNAR NEUROPATHY OF LEFT UPPER EXTREMITY: ICD-10-CM

## 2022-06-20 DIAGNOSIS — M54.12 CERVICAL RADICULAR PAIN: ICD-10-CM

## 2022-06-20 DIAGNOSIS — G56.02 CARPAL TUNNEL SYNDROME, LEFT: Primary | ICD-10-CM

## 2022-06-20 DIAGNOSIS — M50.30 DDD (DEGENERATIVE DISC DISEASE), CERVICAL: ICD-10-CM

## 2022-06-20 PROCEDURE — 95911 PR NERVE CONDUCTION STUDY; 9-10 STUDIES: ICD-10-PCS | Mod: S$GLB,,, | Performed by: PSYCHIATRY & NEUROLOGY

## 2022-06-20 PROCEDURE — 95911 NRV CNDJ TEST 9-10 STUDIES: CPT | Mod: S$GLB,,, | Performed by: PSYCHIATRY & NEUROLOGY

## 2022-06-20 PROCEDURE — 95886 MUSC TEST DONE W/N TEST COMP: CPT | Mod: S$GLB,,, | Performed by: PSYCHIATRY & NEUROLOGY

## 2022-06-20 PROCEDURE — 95886 PR EMG COMPLETE, W/ NERVE CONDUCTION STUDIES, 5+ MUSCLES: ICD-10-PCS | Mod: S$GLB,,, | Performed by: PSYCHIATRY & NEUROLOGY

## 2022-06-24 ENCOUNTER — TELEPHONE (OUTPATIENT)
Dept: NEUROLOGY | Facility: CLINIC | Age: 62
End: 2022-06-24
Payer: COMMERCIAL

## 2022-06-24 NOTE — TELEPHONE ENCOUNTER
Primary number on account is not in service. Called pt's spouse, Abdi--M with appt info and office number.

## 2022-07-18 NOTE — PROCEDURES
[facility]  [address] [facility] [address] [place] [address]  [phone]      Full Name: Laura Aquino Gender: Female  Patient ID: [id number] YOB: 1960      Visit Date: 6/20/2022 08:21  Age: 61 Years  Examining Physician: Hanh Otoole D.O., ABPN, AOBNP, ABEM   Referring Physician: Karen Wade PA-C   Technologist: EDITH Francis   Height: 5 feet 5 inch  History: Patient complains of numbness in her feet and hands bilaterally.  She also complains of neck pain.  She has a history of diabetes, but her numbers improved and she is no longer requiring medication for this.  She has been referred for an EMG of both upper extremities.        Sensory NCS      Nerve / Sites Rec. Site Onset Lat Peak Lat NP Amp Segments Distance Velocity Temp.     ms ms µV  cm m/s °C   L Median - Digit II (Antidromic)      Wrist Dig II 3.08 3.67 11.5 Wrist - Dig II 13 42 36.4      Ref.   ?3.80 ?10.0 Ref.  ?50    R Median - Digit II (Antidromic)      Wrist Dig II 2.71 3.33 10.5 Wrist - Dig II 13 48 36.4      Ref.   ?3.80 ?10.0 Ref.  ?50    L Ulnar - Digit V (Antidromic)      Wrist Dig V 2.35 3.02 6.7 Wrist - Dig V 11 47 36.4      Ref.   ?3.20 ?10.0 Ref.  ?50    R Ulnar - Digit V (Antidromic)      Wrist Dig V 2.31 3.00 10.4 Wrist - Dig V 11 48 36.4      Ref.   ?3.20 ?10.0 Ref.  ?50        Motor NCS      Nerve / Sites Muscle Latency Ref. Amplitude Ref. Amp % Duration Segments Distance Lat Diff Ref. Velocity Ref. Temp.     ms ms mV mV % ms  cm ms ms m/s m/s °C   L Median - APB      Wrist APB 3.71 ?4.00 13.5 ?5.0 100 5.83 Wrist - APB      36.4      Elbow APB 8.10  12.1  89.6 5.92 Elbow - Wrist 24 4.40  55 ?50 36.4   R Median - APB      Wrist APB 3.63 ?4.00 9.6 ?5.0 100 5.98 Wrist - APB      36.4      Elbow APB 8.17  9.4  97.8 6.25 Elbow - Wrist 24 4.54  53 ?50 36.4   L Ulnar - ADM      Wrist ADM 2.92 ?3.10 7.2 ?7.0 100 6.25 Wrist - ADM      36.4      B.Elbow ADM 6.88  7.1  98.8  6.54 B.Elbow - Wrist 20 3.96  51 ?50 36.4      A.Elbow ADM 8.85  6.9  95.8 6.77 A.Elbow - B.Elbow 11 1.98  56  36.4   R Ulnar - ADM      Wrist ADM 2.83 ?3.10 8.5 ?7.0 100 6.19 Wrist - ADM      36.4      B.Elbow ADM 6.69  7.5  87.5 6.06 B.Elbow - Wrist 22 3.85  57 ?50 36.4      A.Elbow ADM 8.56  7.0  81.6 6.33 A.Elbow - B.Elbow 11.5 1.88  61  36.4   L Median, Ulnar - Lumbrical-Interossei      Median Wrist Lumb II 4.06  1.0  100 6.35 Median Wrist - Lumb II 10     36.4      Ulnar Wrist Lumb II 3.27  6.9  677 5.04 Ulnar Wrist - Lumb II 10     36.4           Median Wrist - Ulnar Wrist  0.79 ?0.50   36.4   R Median, Ulnar - Lumbrical-Interossei      Median Wrist Lumb II 3.88  1.5  100 4.77 Median Wrist - Lumb II 10     36.4      Ulnar Wrist Lumb II 3.40  5.8  374 4.44 Ulnar Wrist - Lumb II 10     36.4           Median Wrist - Ulnar Wrist  0.48 ?0.50   36.4       F  Wave      Nerve Fmin Ref.    ms ms   L Median - APB 28.44 ?31.00   L Ulnar - ADM 29.95 ?32.00   R Median - APB 29.48 ?31.00   R Ulnar - ADM 29.11 ?32.00       EMG Summary Table     Spontaneous Recruitment Activation Duration Amplitude Polyphasia Comment   Muscle Ins Act Fib Fasc Pattern - - - - -   R. First dorsal interosseous Normal 0 0 Normal Normal Normal Normal Normal Normal   R. Abductor pollicis brevis Normal 0 0 Normal Normal Normal Normal Normal Normal   R. Pronator teres Normal 0 0 Normal Normal Normal Normal Normal Normal   R. Biceps brachii Normal 0 0 Normal Normal Normal Normal Normal Normal   R. Triceps brachii Normal 0 0 Normal Normal Normal Normal Normal Normal   R. Deltoid Normal 0 0 Normal Normal Normal Normal Normal Normal   R. Cervical paraspinals Normal 0 0      Normal   L. First dorsal interosseous Normal 0 0 Normal Normal Normal Normal Normal Normal   L. Abductor pollicis brevis Normal 0 0 Normal Normal Normal Normal Normal Normal   L. Pronator teres Normal 0 0 Normal Normal Normal Normal Normal Normal   L. Flexor digitorum profundus  (Ulnar) Normal 0 0 Normal Normal Normal Normal Normal Normal   L. Biceps brachii Normal 0 0 Normal Normal Normal Normal Normal Normal   L. Triceps brachii Normal 0 0 Normal Normal Normal Normal Normal Normal   L. Deltoid Normal 0 0 Normal Normal Normal Normal Normal Normal   L. Cervical paraspinals Normal 0 0      Normal           Summary:  Nerve conduction studies were performed on both upper extremities.  Bilateral median sensory responses were normal.  Left ulnar sensory response was reduced in amplitude with normal latency.  Right ulnar sensory response was normal in amplitude and latency.  Bilateral median and ulnar motor responses were normal in amplitude, latency and velocity.  To evaluate the carpal tunnel, further internal comparison studies were performed.  Left median versus ulnar 2nd lumbrical interosseous comparison study revealed a mildly prolonged median latency as compared to the ulnar.  Right median versus ulnar 2nd lumbrical interosseous comparison studies revealed no significant difference in latency.  Bilateral median and ulnar minimal F wave latencies were normal.  Needle EMG was performed in both upper extremities and cervical paraspinal muscles.  No active denervation was present in any muscle tested.  Motor unit morphology and recruitment patterns were normal in every muscle tested.    Impression:  This is a borderline abnormal EMG of both upper extremities.  The findings are as follows:  1.  There is evidence that is suggestive, but not diagnostic of very mild, left median mononeuropathy across the wrist (carpal tunnel syndrome).  Clinical correlation is advised.  2. There is evidence that is suggestive, but not diagnostic of a very mild, left ulnar neuropathy that is not localizable at this time.  Clinical correlation is advised.   There is no evidence of any other focal neuropathy, peripheral neuropathy, plexopathy or radiculopathy on this study.      Thank you for referring to the [facility] in [place]. Please feel free to contact the clinic if you have any further questions regarding this study or report.       _____________________________  Hanh Otoole D.O., ABPN, AOBNP, LUIS

## 2022-07-29 ENCOUNTER — TELEPHONE (OUTPATIENT)
Dept: NEUROSURGERY | Facility: CLINIC | Age: 62
End: 2022-07-29
Payer: COMMERCIAL

## 2022-07-29 NOTE — TELEPHONE ENCOUNTER
----- Message from Karen Wade PA-C sent at 7/26/2022 10:55 AM CDT -----  She still has not completed the xrays I ordered

## 2023-02-08 ENCOUNTER — OFFICE VISIT (OUTPATIENT)
Dept: OPHTHALMOLOGY | Facility: CLINIC | Age: 63
End: 2023-02-08
Payer: COMMERCIAL

## 2023-02-08 DIAGNOSIS — H40.013 OPEN ANGLE WITH BORDERLINE FINDINGS AND LOW GLAUCOMA RISK IN BOTH EYES: Primary | ICD-10-CM

## 2023-02-08 DIAGNOSIS — H52.7 REFRACTIVE ERROR: ICD-10-CM

## 2023-02-08 DIAGNOSIS — H25.13 AGE-RELATED NUCLEAR CATARACT OF BOTH EYES: ICD-10-CM

## 2023-02-08 DIAGNOSIS — H43.391 VITREOUS FLOATERS OF RIGHT EYE: ICD-10-CM

## 2023-02-08 DIAGNOSIS — H04.123 DRY EYES, BILATERAL: ICD-10-CM

## 2023-02-08 PROCEDURE — 99999 PR PBB SHADOW E&M-EST. PATIENT-LVL III: CPT | Mod: PBBFAC,,, | Performed by: OPHTHALMOLOGY

## 2023-02-08 PROCEDURE — 1159F PR MEDICATION LIST DOCUMENTED IN MEDICAL RECORD: ICD-10-PCS | Mod: CPTII,S$GLB,, | Performed by: OPHTHALMOLOGY

## 2023-02-08 PROCEDURE — 99214 PR OFFICE/OUTPT VISIT, EST, LEVL IV, 30-39 MIN: ICD-10-PCS | Mod: S$GLB,,, | Performed by: OPHTHALMOLOGY

## 2023-02-08 PROCEDURE — 92015 PR REFRACTION: ICD-10-PCS | Mod: S$GLB,,, | Performed by: OPHTHALMOLOGY

## 2023-02-08 PROCEDURE — 1159F MED LIST DOCD IN RCRD: CPT | Mod: CPTII,S$GLB,, | Performed by: OPHTHALMOLOGY

## 2023-02-08 PROCEDURE — 99214 OFFICE O/P EST MOD 30 MIN: CPT | Mod: S$GLB,,, | Performed by: OPHTHALMOLOGY

## 2023-02-08 PROCEDURE — 99999 PR PBB SHADOW E&M-EST. PATIENT-LVL III: ICD-10-PCS | Mod: PBBFAC,,, | Performed by: OPHTHALMOLOGY

## 2023-02-08 PROCEDURE — 1160F PR REVIEW ALL MEDS BY PRESCRIBER/CLIN PHARMACIST DOCUMENTED: ICD-10-PCS | Mod: CPTII,S$GLB,, | Performed by: OPHTHALMOLOGY

## 2023-02-08 PROCEDURE — 1160F RVW MEDS BY RX/DR IN RCRD: CPT | Mod: CPTII,S$GLB,, | Performed by: OPHTHALMOLOGY

## 2023-02-08 PROCEDURE — 92015 DETERMINE REFRACTIVE STATE: CPT | Mod: S$GLB,,, | Performed by: OPHTHALMOLOGY

## 2023-02-08 RX ORDER — LATANOPROST 50 UG/ML
1 SOLUTION/ DROPS OPHTHALMIC NIGHTLY
Qty: 2.5 ML | Refills: 11 | Status: SHIPPED | OUTPATIENT
Start: 2023-02-08 | End: 2023-08-16 | Stop reason: CLARIF

## 2023-02-08 NOTE — PATIENT INSTRUCTIONS
Start latanoprost 1 drop at bedtime both eyes     Daily eyelid hygiene (hand out given)    -  Warm compresses to eyelids along with eyelid massages at least twice daily both eyelids  - Systane complete 3-4x daliy both eyes  -  Avoid Visine and Clear Eyes if they are not the preservative free brand  -  Systane gel at bedtime both eyes  -  Counseled on external factors that can worsen symptom such as air vents and ceiling fans if they are blowing directly on patient for extended amounts of time  -  Counseled on taking more breaks when using the computer and reading

## 2023-02-09 NOTE — PROGRESS NOTES
HPI    DLS 09/21/2021    6 MO IOP CK AND DILATION  Pt states in the last few days had noticed a blue ring around both   eyeballs.  Daughter has noticed it also.  Ou feel like they produce sand   and makes OU very uncomfortable.    Systane    Latanoprost- not used  doesn't  remember discussing the drop.    Last edited by Irasema Rodney on 2/8/2023  3:42 PM.        ROS    Negative for: Constitutional, Gastrointestinal, Neurological, Skin,   Genitourinary, Musculoskeletal, HENT, Endocrine, Cardiovascular, Eyes,   Respiratory, Psychiatric, Allergic/Imm, Heme/Lymph  Last edited by Camilo Wen Jr., MD on 2/8/2023  4:22 PM.        Assessment /Plan     For exam results, see Encounter Report.    Open angle with borderline findings and low glaucoma risk in both eyes  -     latanoprost 0.005 % ophthalmic solution; Place 1 drop into both eyes every evening.  Dispense: 2.5 mL; Refill: 11    Age-related nuclear cataract of both eyes    Vitreous floaters of right eye    Dry eyes, bilateral    Refractive error      Start latanoprost 1 drop at bedtime both eyes   Daily eyelid hygiene (hand out given)  -  Warm compresses to eyelids along with eyelid massages at least twice daily both eyelids  - Systane complete 3-4x daliy both eyes  -  Avoid Visine and Clear Eyes if they are not the preservative free brand  -  Systane gel at bedtime both eyes  -  Counseled on external factors that can worsen symptom such as air vents and ceiling fans if they are blowing directly on patient for extended amounts of time  -  Counseled on taking more breaks when using the computer and reading  Rx for glasses given to patient  Follow up in about 4 months (around 6/8/2023) for IOP and Medication check, HVF, OCT ON.

## 2023-04-11 ENCOUNTER — TELEPHONE (OUTPATIENT)
Dept: NEUROLOGY | Facility: CLINIC | Age: 63
End: 2023-04-11
Payer: COMMERCIAL

## 2023-04-11 NOTE — TELEPHONE ENCOUNTER
Left message on the pt's spouse phone number in regards to getting the pt scheduled with the appropriate provider.  She is scheduled with Dr. Otoole 4/24/23 for TIA. Pt's phone is not working at the present time.

## 2023-04-21 ENCOUNTER — TELEPHONE (OUTPATIENT)
Dept: NEUROLOGY | Facility: CLINIC | Age: 63
End: 2023-04-21
Payer: COMMERCIAL

## 2023-04-21 ENCOUNTER — HOSPITAL ENCOUNTER (OUTPATIENT)
Dept: RADIOLOGY | Facility: HOSPITAL | Age: 63
Discharge: HOME | End: 2023-04-21
Attending: PHYSICIAN ASSISTANT
Payer: COMMERCIAL

## 2023-04-21 DIAGNOSIS — M50.30 DDD (DEGENERATIVE DISC DISEASE), CERVICAL: ICD-10-CM

## 2023-04-21 DIAGNOSIS — M54.12 CERVICAL RADICULAR PAIN: ICD-10-CM

## 2023-04-21 PROCEDURE — 72050 X-RAY EXAM NECK SPINE 4/5VWS: CPT | Mod: 26,,, | Performed by: RADIOLOGY

## 2023-04-21 PROCEDURE — 72050 XR CERVICAL SPINE AP LAT WITH FLEX EXTEN: ICD-10-PCS | Mod: 26,,, | Performed by: RADIOLOGY

## 2023-04-21 PROCEDURE — 72050 X-RAY EXAM NECK SPINE 4/5VWS: CPT | Mod: TC,FY,PO

## 2023-04-21 NOTE — TELEPHONE ENCOUNTER
Called pt to advise her referral has not been approved by her insurance and there is question to if this appt is for stroke or numbness. If TIA, pt is scheduled incorrectly. Appt will be cancelled at this time until we are able to reach the pt to discuss further. The office has reached out multiple times regarding this appt, unable to contact. Pt does not use portal.

## 2023-04-21 NOTE — TELEPHONE ENCOUNTER
Left message to call in regards to the pt's appt on 4/24/23.  Need to speak to the pt about her symptoms. Left messages on the pt and her husbands phones.

## 2023-04-21 NOTE — TELEPHONE ENCOUNTER
Spoke with the pt, advised that she was scheduled incorrectly with Dr. Otoole and her appt was cancelled. I reviewed the pts records with her to determine the plan of treatment provided by Dr. Woods and Karen Wade. I explained that the pt was to follow-up with Karen Wade after having EMG and and x-rays of neck. I explained that their office attempted to reach out to schedule the x-ray's but the incorrect phone number is in the system and they were not able to reach her. The pt provided me with the correct phone number, [phone], number is now updated in the system. X-rays scheduled for today and pt is ready to schedule up with Karen to review results of both EMG and x-rays. Please call the pt to schedule. Thank you.

## 2023-04-28 ENCOUNTER — OFFICE VISIT (OUTPATIENT)
Dept: NEUROSURGERY | Facility: CLINIC | Age: 63
End: 2023-04-28
Payer: COMMERCIAL

## 2023-04-28 VITALS
WEIGHT: 191 LBS | HEART RATE: 84 BPM | SYSTOLIC BLOOD PRESSURE: 145 MMHG | BODY MASS INDEX: 31.82 KG/M2 | RESPIRATION RATE: 18 BRPM | HEIGHT: 65 IN | DIASTOLIC BLOOD PRESSURE: 84 MMHG

## 2023-04-28 DIAGNOSIS — G56.03 BILATERAL CARPAL TUNNEL SYNDROME: ICD-10-CM

## 2023-04-28 DIAGNOSIS — M54.2 NECK PAIN: ICD-10-CM

## 2023-04-28 DIAGNOSIS — M50.30 DDD (DEGENERATIVE DISC DISEASE), CERVICAL: Primary | ICD-10-CM

## 2023-04-28 PROCEDURE — 1159F PR MEDICATION LIST DOCUMENTED IN MEDICAL RECORD: ICD-10-PCS | Mod: CPTII,S$GLB,, | Performed by: PHYSICIAN ASSISTANT

## 2023-04-28 PROCEDURE — 3077F PR MOST RECENT SYSTOLIC BLOOD PRESSURE >= 140 MM HG: ICD-10-PCS | Mod: CPTII,S$GLB,, | Performed by: PHYSICIAN ASSISTANT

## 2023-04-28 PROCEDURE — 3008F BODY MASS INDEX DOCD: CPT | Mod: CPTII,S$GLB,, | Performed by: PHYSICIAN ASSISTANT

## 2023-04-28 PROCEDURE — 3008F PR BODY MASS INDEX (BMI) DOCUMENTED: ICD-10-PCS | Mod: CPTII,S$GLB,, | Performed by: PHYSICIAN ASSISTANT

## 2023-04-28 PROCEDURE — 3079F DIAST BP 80-89 MM HG: CPT | Mod: CPTII,S$GLB,, | Performed by: PHYSICIAN ASSISTANT

## 2023-04-28 PROCEDURE — 1159F MED LIST DOCD IN RCRD: CPT | Mod: CPTII,S$GLB,, | Performed by: PHYSICIAN ASSISTANT

## 2023-04-28 PROCEDURE — 3077F SYST BP >= 140 MM HG: CPT | Mod: CPTII,S$GLB,, | Performed by: PHYSICIAN ASSISTANT

## 2023-04-28 PROCEDURE — 1160F PR REVIEW ALL MEDS BY PRESCRIBER/CLIN PHARMACIST DOCUMENTED: ICD-10-PCS | Mod: CPTII,S$GLB,, | Performed by: PHYSICIAN ASSISTANT

## 2023-04-28 PROCEDURE — 99214 OFFICE O/P EST MOD 30 MIN: CPT | Mod: S$GLB,,, | Performed by: PHYSICIAN ASSISTANT

## 2023-04-28 PROCEDURE — 1160F RVW MEDS BY RX/DR IN RCRD: CPT | Mod: CPTII,S$GLB,, | Performed by: PHYSICIAN ASSISTANT

## 2023-04-28 PROCEDURE — 99214 PR OFFICE/OUTPT VISIT, EST, LEVL IV, 30-39 MIN: ICD-10-PCS | Mod: S$GLB,,, | Performed by: PHYSICIAN ASSISTANT

## 2023-04-28 PROCEDURE — 3079F PR MOST RECENT DIASTOLIC BLOOD PRESSURE 80-89 MM HG: ICD-10-PCS | Mod: CPTII,S$GLB,, | Performed by: PHYSICIAN ASSISTANT

## 2023-04-28 NOTE — PROGRESS NOTES
[facility] - Neurosurgery  Clinic Progress Note       PCP: Micheline Sears MD    SUBJECTIVE:     Chief Complaint:   Chief Complaint   Patient presents with    Spine Pain     Patient present to clinic today as spine pain. States of bilateral numbness/tingling of arms/legs. Trouble sleeping; experiencing muscle spasms and weakness.      Interval History 4/28/23  Patient returns after completing MRI, XR and EMG. She continues to have neck pain. MRI revealed multilevel degeneration with foraminal stenosis. EMG revealed carpal tunnel. She reports the tingling in her hands it worse at night and wakes her. She sleeps with her wrists flexed. She also reports low back pain with tingling in her feet.       History of Present Illness 1/26/22  Laura Aquino is a 60 yo right handed female with HTN, HLD, DM who presents for evaluation of neck pain. She reports the pain is present in the posterior neck and radiates into the medial right shoulder blade. She reports numbness in bilateral hands and does drop objects occasionally. She has known disc degeneration with stenosis of the cervical spine. She has attended PT in the past without significant improvement. She has not received injections with pain management. She has numbness in her feet and is scheduled for EMG to investigate this.     Pertinent and recent history, provider evaluations, imaging and data reviewed in EPIC    Past Medical History:   Diagnosis Date    Abdominal wall mass     Abnormal results of liver function studies     AD (atopic dermatitis)     Diabetes mellitus     Diverticulosis     Hepatic steatosis     Hyperlipidemia     Hypertension     Open angle glaucoma suspect with borderline findings at low risk     Precordial pain 10/2019    Skin sensation disturbance      Past Surgical History:   Procedure Laterality Date    ANGIOGRAM, CORONARY, WITH LEFT HEART CATHETERIZATION N/A 10/14/2019    Procedure: Angiogram, Coronary, with Left Heart  Cath;  Surgeon: Franki Carroll MD;  Location: [facility] CATH;  Service: Cardiology;  Laterality: N/A;    COLONOSCOPY  2020    Dr. Lopez, in procedures: diverticulosis, fair prep; repeat in 5 years for screening    ESOPHAGOGASTRODUODENOSCOPY N/A 2020    Procedure: EGD (ESOPHAGOGASTRODUODENOSCOPY);  Surgeon: Hermilo Quispe Jr., MD;  Location: [facility] ENDO;  Service: Endoscopy;  Laterality: N/A;    HYSTERECTOMY      Complete    LEFT HEART CATHETERIZATION Left 10/14/2019    Procedure: Left heart cath;  Surgeon: Franki Carroll MD;  Location: [facility] CATH;  Service: Cardiology;  Laterality: Left;    LIPOMA RESECTION      TONSILLECTOMY Bilateral 2020    Procedure: TONSILLECTOMY;  Surgeon: Pk Kirkpatrick MD;  Location: [facility] CSC;  Service: ENT;  Laterality: Bilateral;    TOTAL ABDOMINAL HYSTERECTOMY W/ BILATERAL SALPINGOOPHORECTOMY Bilateral     w/ hysterectomy    TUBAL LIGATION       Family History   Problem Relation Age of Onset    Asthma Mother     Diabetes Mother     Cataracts Mother     Anxiety disorder Sister     Anxiety disorder Brother     Diabetes Father     Sarcoidosis Daughter     HIV Son     No Known Problems Son     No Known Problems Son     Colon cancer Neg Hx     Colon polyps Neg Hx     Crohn's disease Neg Hx     Ulcerative colitis Neg Hx     Stomach cancer Neg Hx     Esophageal cancer Neg Hx     Glaucoma Neg Hx     Macular degeneration Neg Hx     Retinal detachment Neg Hx      Social History     Tobacco Use    Smoking status: Former     Packs/day: 1.00     Years: 5.00     Pack years: 5.00     Types: Cigarettes     Quit date: 10/1/1993     Years since quittin.5    Smokeless tobacco: Never   Substance Use Topics    Alcohol use: No     Alcohol/week: 0.0 standard drinks    Drug use: No      Review of patient's allergies indicates:  No Known Allergies    Current Outpatient Medications:     ALPRAZolam (XANAX) 0.5 MG tablet, Take 1 tablet (0.5 mg total) by mouth daily as needed for  Anxiety., Disp: 30 tablet, Rfl: 0    atorvastatin (LIPITOR) 40 MG tablet, Take 1 tablet (40 mg total) by mouth once daily., Disp: 90 tablet, Rfl: 4    azelastine (ASTELIN) 137 mcg (0.1 %) nasal spray, USE 2 SPRAY(S) IN EACH NOSTRIL TWICE DAILY, Disp: , Rfl:     cholecalciferol, vitamin D3, (VITAMIN D3) 25 mcg (1,000 unit) capsule, Take 2 capsules (2,000 Units total) by mouth once daily., Disp: 90 capsule, Rfl: 3    doxycycline (VIBRA-TABS) 100 MG tablet, Take 1 tablet (100 mg total) by mouth 2 (two) times daily., Disp: 20 tablet, Rfl: 0    lactobacillus combination no.8 (ADULT PROBIOTIC ORAL), Take by mouth once daily., Disp: , Rfl:     latanoprost 0.005 % ophthalmic solution, Place 1 drop into both eyes every evening., Disp: 2.5 mL, Rfl: 11    magnesium oxide (MAG-OX) 400 mg (241.3 mg magnesium) tablet, Take 1 tablet (400 mg total) by mouth once daily., Disp: , Rfl: 0    mv-mn/iron/folic acid/herb 190 (VITAMIN D3 COMPLETE ORAL), Take by mouth once daily., Disp: , Rfl:     promethazine (PHENERGAN) 6.25 mg/5 mL syrup, Take 5 mLs (6.25 mg total) by mouth every 6 (six) hours as needed (cough)., Disp: 118 mL, Rfl: 0    albuterol (PROAIR HFA) 90 mcg/actuation inhaler, Inhale 2 puffs into the lungs every 4 (four) hours as needed for Wheezing or Shortness of Breath. Rescue, Disp: 6.7 g, Rfl: 0    amLODIPine (NORVASC) 5 MG tablet, Take 1 tablet (5 mg total) by mouth once daily., Disp: 90 tablet, Rfl: 1    nitroGLYCERIN (NITROSTAT) 0.4 MG SL tablet, Place 1 tablet (0.4 mg total) under the tongue every 5 (five) minutes as needed for Chest pain., Disp: 100 tablet, Rfl: 0    Review of Systems:   Constitutional: no fever, chills or night sweats. No changes in weight   Eyes: no visual changes   ENT: no nasal congestion or sore throat   Respiratory: no cough or shortness of breath   Cardiovascular: no chest pain or palpitations   Gastrointestinal: no nausea or vomiting   Genitourinary: no hematuria or dysuria   Integument/Breast:  "no rash or pruritis   Hematologic/Lymphatic: no easy bruising or lymphadenopathy   Musculoskeletal: +neck pain   Neurological: no seizures or tremors +numbness    Behavioral/Psych: no auditory or visual hallucinations   Endocrine: no heat or cold intolerance         OBJECTIVE:     Vital Signs (Most Recent):  Pulse: 84 (04/28/23 1157)  Resp: 18 (04/28/23 1157)  BP: (!) 145/84 (04/28/23 1157)  Estimated body mass index is 31.78 kg/m² as calculated from the following:    Height as of this encounter: 5' 5" (1.651 m).    Weight as of this encounter: 86.6 kg (191 lb).    Physical Exam:   General: well developed, well nourished, no distress.   Neurologic: Alert and oriented. Thought content appropriate. GCS 15.   Language: No aphasia  Speech: No dysarthria  Head: normocephalic, atraumatic  Eyes: EOMI.  Neck: trachea midline, no JVD   Cardiovascular: no LE edema  Pulmonary: normal respirations, no signs of respiratory distress  Abdomen: non-distended  Sensory: intact to light touch throughout  Skin: Skin is warm, dry and intact.    Motor Strength: Moves all extremities spontaneously with good tone. No abnormal movements seen.     Strength  Deltoids Triceps Biceps Wrist Extension Wrist Flexion Hand  Interossei   Upper: R 5/5 5/5 5/5 5/5 5/5 5/5 5/5    L 5/5 5/5 5/5 5/5 5/5 5/5 5/5     Iliopsoas Quadriceps Knee  Flexion Tibialis  anterior Gastro- cnemius EHL    Lower: R 5/5 5/5 5/5 5/5 5/5 5/5     L 5/5 5/5 5/5 5/5 5/5 5/5      DTR's: 2 +   White: absent  Clonus: absent     Gait: normal    Able to stand on heels and toes     +Tinel's bilateral wrist         Diagnostic Results:  I have independently reviewed the following imaging:  FINDINGS:  The dens and lateral masses are intact and aligned.  Lung apices are clear.  Straightening of the normal cervical lordosis.  Unchanged retrolisthesis of C5 on C6 without evidence of dynamic instability.  No soft tissue swelling.  Vertebral body heights are maintained lumbar " displaced fracture or aggressive osseous abnormality.  Intervertebral disc space height loss with degenerative endplate change at C5-C6 and C6-C7.     Impression:     Degenerative changes at C5-C6 and C6-C7.  Unchanged mild retrolisthesis of C5-C6.        Electronically signed by: Tiffanie Summers  Date:                                            04/21/2023  Time:                                           12:52    MRI cervical spine  Impression:     C5-C6 broad-based central to left paracentral posterolateral protrusion type herniation which appears slightly more prominent than seen on the prior exam, anterior spinal cord contact, left-sided lateral recess and foraminal narrowing with nerve root contact possible impingement.     C6-C7 severe broad-based disc bulge with moderate foraminal narrowing bilaterally and bilateral nerve root contact.        Electronically signed by: Jase Reddy MD  Date:                                            04/15/2022  Time:                                           13:22    EMG UE  Impression:  This is a borderline abnormal EMG of both upper extremities.  The findings are as follows:   There is evidence that is suggestive, but not diagnostic of very mild, left median mononeuropathy across the wrist (carpal tunnel syndrome).  Clinical correlation is advised.  There is evidence that is suggestive, but not diagnostic of a very mild, left ulnar neuropathy that is not localizable at this time.  Clinical correlation is advised.   There is no evidence of any other focal neuropathy, peripheral neuropathy, plexopathy or radiculopathy on this study.       ASSESSMENT/PLAN:     DDD (degenerative disc disease), cervical  -     Ambulatory referral/consult to Pain Clinic; Future; Expected date: 05/05/2023  -     Procedure Order to Pain Management; Future; Expected date: 04/28/2023    Neck pain  -     Ambulatory referral/consult to Pain Clinic; Future; Expected date: 05/05/2023  -     Procedure  Order to Pain Management; Future; Expected date: 04/28/2023    Bilateral carpal tunnel syndrome  Comments:  trial wrist splints at night  consider hand surgery referral if persists        Laura M Sims is a 62 y.o. female with chronic neck and back pain and paresthesias in the hands and feet. We discussed trying injections with pain management for the neck and back pain. I ordered a cervical JAMIE and she will also complete a consultation with pain management to discuss further treatments for her neck and back pain. We discussed trialing wrist braces while she sleeps for her carpal tunnel. If her symptoms persist, she would consider referral to hand surgery. She may follow up with us as needed.     Patient verbalized understanding of plan. Encouraged to call with any questions or concerns.

## 2023-05-01 ENCOUNTER — TELEPHONE (OUTPATIENT)
Dept: PAIN MEDICINE | Facility: CLINIC | Age: 63
End: 2023-05-01
Payer: COMMERCIAL

## 2023-05-01 NOTE — TELEPHONE ENCOUNTER
Please schedule this patient will follow up in the office with me to review her MRI and discuss treatment options

## 2023-05-15 ENCOUNTER — TELEPHONE (OUTPATIENT)
Dept: PAIN MEDICINE | Facility: CLINIC | Age: 63
End: 2023-05-15
Payer: COMMERCIAL

## 2023-05-15 NOTE — TELEPHONE ENCOUNTER
Called pt and didn't answer. Left pt a voicemail about her appointment being rescheduled gave pt the next available appointment.

## 2023-05-19 ENCOUNTER — OFFICE VISIT (OUTPATIENT)
Dept: PAIN MEDICINE | Facility: CLINIC | Age: 63
End: 2023-05-19
Payer: COMMERCIAL

## 2023-05-19 VITALS
BODY MASS INDEX: 32.68 KG/M2 | HEART RATE: 67 BPM | DIASTOLIC BLOOD PRESSURE: 78 MMHG | SYSTOLIC BLOOD PRESSURE: 149 MMHG | WEIGHT: 196.13 LBS | HEIGHT: 65 IN

## 2023-05-19 DIAGNOSIS — M47.812 CERVICAL SPONDYLOSIS: ICD-10-CM

## 2023-05-19 DIAGNOSIS — M54.12 CERVICAL RADICULOPATHY: Primary | ICD-10-CM

## 2023-05-19 DIAGNOSIS — M54.2 NECK PAIN: ICD-10-CM

## 2023-05-19 DIAGNOSIS — M50.30 DDD (DEGENERATIVE DISC DISEASE), CERVICAL: ICD-10-CM

## 2023-05-19 PROCEDURE — 99214 PR OFFICE/OUTPT VISIT, EST, LEVL IV, 30-39 MIN: ICD-10-PCS | Mod: S$GLB,,,

## 2023-05-19 PROCEDURE — 3078F PR MOST RECENT DIASTOLIC BLOOD PRESSURE < 80 MM HG: ICD-10-PCS | Mod: CPTII,S$GLB,,

## 2023-05-19 PROCEDURE — 3077F PR MOST RECENT SYSTOLIC BLOOD PRESSURE >= 140 MM HG: ICD-10-PCS | Mod: CPTII,S$GLB,,

## 2023-05-19 PROCEDURE — 99214 OFFICE O/P EST MOD 30 MIN: CPT | Mod: S$GLB,,,

## 2023-05-19 PROCEDURE — 3077F SYST BP >= 140 MM HG: CPT | Mod: CPTII,S$GLB,,

## 2023-05-19 PROCEDURE — 99999 PR PBB SHADOW E&M-EST. PATIENT-LVL IV: CPT | Mod: PBBFAC,,,

## 2023-05-19 PROCEDURE — 1159F PR MEDICATION LIST DOCUMENTED IN MEDICAL RECORD: ICD-10-PCS | Mod: CPTII,S$GLB,,

## 2023-05-19 PROCEDURE — 3008F PR BODY MASS INDEX (BMI) DOCUMENTED: ICD-10-PCS | Mod: CPTII,S$GLB,,

## 2023-05-19 PROCEDURE — 3008F BODY MASS INDEX DOCD: CPT | Mod: CPTII,S$GLB,,

## 2023-05-19 PROCEDURE — 1159F MED LIST DOCD IN RCRD: CPT | Mod: CPTII,S$GLB,,

## 2023-05-19 PROCEDURE — 99999 PR PBB SHADOW E&M-EST. PATIENT-LVL IV: ICD-10-PCS | Mod: PBBFAC,,,

## 2023-05-19 PROCEDURE — 3078F DIAST BP <80 MM HG: CPT | Mod: CPTII,S$GLB,,

## 2023-05-19 RX ORDER — PREGABALIN 75 MG/1
75 CAPSULE ORAL 2 TIMES DAILY
Qty: 60 CAPSULE | Refills: 1 | Status: SHIPPED | OUTPATIENT
Start: 2023-05-19 | End: 2023-07-26

## 2023-05-19 NOTE — PROGRESS NOTES
[facility] Follow Up Evaluation    Referred by: Karen Wade PA-C  Reason for referral: neck pain    CC:   Chief Complaint   Patient presents with    Neck Pain    Foot Pain    Hand Pain    Shoulder Pain      Last 3 PDI Scores 4/8/2022   Pain Disability Index (PDI) 37     Interval HPI 5/19/2023: Laura Aquino returns to the office for follow up.   She returns for follow-up with continued neck pain, 4-8/10, mainly located in her neck with some radiation into her shoulders.  She also reports intermittent severe stabbing pain in her right shoulder blade with no known aggravating or alleviating factors.  She denies any significant pain radiating down her arms but does report numbness and tingling in bilateral hands that is worse at night when sleeping.  She denies any weakness or any new changes to her bowel or bladder function.  She previously had some worsening lower back pain however now her back pain has significantly improved.      HPI:   Laura Aquino is a 62 y.o. female who presents with neck pain.  Pain is constant, sharp, aching with radiation to the bilateral shoulders.  She denies any numbness or weakness.    History:    Current Outpatient Medications:     ALPRAZolam (XANAX) 0.5 MG tablet, Take 1 tablet (0.5 mg total) by mouth daily as needed for Anxiety., Disp: 30 tablet, Rfl: 0    atorvastatin (LIPITOR) 40 MG tablet, Take 1 tablet (40 mg total) by mouth once daily., Disp: 90 tablet, Rfl: 4    azelastine (ASTELIN) 137 mcg (0.1 %) nasal spray, USE 2 SPRAY(S) IN EACH NOSTRIL TWICE DAILY, Disp: , Rfl:     cholecalciferol, vitamin D3, (VITAMIN D3) 25 mcg (1,000 unit) capsule, Take 2 capsules (2,000 Units total) by mouth once daily., Disp: 90 capsule, Rfl: 3    doxycycline (VIBRA-TABS) 100 MG tablet, Take 1 tablet (100 mg total) by mouth 2 (two) times daily., Disp: 20 tablet, Rfl: 0    lactobacillus combination no.8 (ADULT PROBIOTIC ORAL), Take by mouth once daily., Disp: , Rfl:     latanoprost  0.005 % ophthalmic solution, Place 1 drop into both eyes every evening., Disp: 2.5 mL, Rfl: 11    magnesium oxide (MAG-OX) 400 mg (241.3 mg magnesium) tablet, Take 1 tablet (400 mg total) by mouth once daily., Disp: , Rfl: 0    mv-mn/iron/folic acid/herb 190 (VITAMIN D3 COMPLETE ORAL), Take by mouth once daily., Disp: , Rfl:     promethazine (PHENERGAN) 6.25 mg/5 mL syrup, Take 5 mLs (6.25 mg total) by mouth every 6 (six) hours as needed (cough)., Disp: 118 mL, Rfl: 0    albuterol (PROAIR HFA) 90 mcg/actuation inhaler, Inhale 2 puffs into the lungs every 4 (four) hours as needed for Wheezing or Shortness of Breath. Rescue, Disp: 6.7 g, Rfl: 0    amLODIPine (NORVASC) 5 MG tablet, Take 1 tablet (5 mg total) by mouth once daily., Disp: 90 tablet, Rfl: 1    nitroGLYCERIN (NITROSTAT) 0.4 MG SL tablet, Place 1 tablet (0.4 mg total) under the tongue every 5 (five) minutes as needed for Chest pain., Disp: 100 tablet, Rfl: 0    Past Medical History:   Diagnosis Date    Abdominal wall mass     Abnormal results of liver function studies     AD (atopic dermatitis)     Diabetes mellitus     Diverticulosis     Hepatic steatosis     Hyperlipidemia     Hypertension     Open angle glaucoma suspect with borderline findings at low risk     Precordial pain 10/2019    Skin sensation disturbance        Past Surgical History:   Procedure Laterality Date    ANGIOGRAM, CORONARY, WITH LEFT HEART CATHETERIZATION N/A 10/14/2019    Procedure: Angiogram, Coronary, with Left Heart Cath;  Surgeon: Franki Carroll MD;  Location: [facility];  Service: Cardiology;  Laterality: N/A;    COLONOSCOPY  08/12/2020    Dr. Lopez, in procedures: diverticulosis, fair prep; repeat in 5 years for screening    ESOPHAGOGASTRODUODENOSCOPY N/A 11/6/2020    Procedure: EGD (ESOPHAGOGASTRODUODENOSCOPY);  Surgeon: Hermilo Quispe Jr., MD;  Location: [facility] ENDO;  Service: Endoscopy;  Laterality: N/A;    HYSTERECTOMY  1999    Complete    LEFT HEART CATHETERIZATION Left  10/14/2019    Procedure: Left heart cath;  Surgeon: Franki Carroll MD;  Location: [facility] CATH;  Service: Cardiology;  Laterality: Left;    LIPOMA RESECTION      TONSILLECTOMY Bilateral 2020    Procedure: TONSILLECTOMY;  Surgeon: Pk Kirkpatrick MD;  Location: [facility] CSC;  Service: ENT;  Laterality: Bilateral;    TOTAL ABDOMINAL HYSTERECTOMY W/ BILATERAL SALPINGOOPHORECTOMY Bilateral     w/ hysterectomy    TUBAL LIGATION         Family History   Problem Relation Age of Onset    Asthma Mother     Diabetes Mother     Cataracts Mother     Anxiety disorder Sister     Anxiety disorder Brother     Diabetes Father     Sarcoidosis Daughter     HIV Son     No Known Problems Son     No Known Problems Son     Colon cancer Neg Hx     Colon polyps Neg Hx     Crohn's disease Neg Hx     Ulcerative colitis Neg Hx     Stomach cancer Neg Hx     Esophageal cancer Neg Hx     Glaucoma Neg Hx     Macular degeneration Neg Hx     Retinal detachment Neg Hx        Social History     Socioeconomic History    Marital status:    Tobacco Use    Smoking status: Former     Packs/day: 1.00     Years: 5.00     Pack years: 5.00     Types: Cigarettes     Quit date: 10/1/1993     Years since quittin.6    Smokeless tobacco: Never   Substance and Sexual Activity    Alcohol use: No     Alcohol/week: 0.0 standard drinks    Drug use: No   Social History Narrative    ** Merged History Encounter **            Review of patient's allergies indicates:  No Known Allergies    Review of Systems:  General ROS: negative for - fever  Psychological ROS: negative for - hostility  Hematological and Lymphatic ROS: negative for - bleeding problems  Endocrine ROS: negative for - unexpected weight changes  Respiratory ROS: no cough, shortness of breath, or wheezing  Cardiovascular ROS: no chest pain or dyspnea on exertion  Gastrointestinal ROS: no abdominal pain, change in bowel habits, or black or bloody stools  Musculoskeletal ROS: negative for -  "muscular weakness  Neurological ROS: negative for - numbness/tingling  Dermatological ROS: negative for rash    Physical Exam:  Vitals:    05/19/23 1528   BP: (!) 149/78   Pulse: 67   Weight: 89 kg (196 lb 1.6 oz)   Height: 5' 5" (1.651 m)   PainSc:   3   PainLoc: Neck     Body mass index is 32.63 kg/m².    Gen: NAD  Psych: mood appropriate for given condition  CV: 2+ radial pulse  HEENT: anicteric   Respiratory: non labored  Abd: soft nt, nd  Skin: intact  Sensation: intact to lt touch bilaterally in c4-t1   Reflexes: 0+ b/l Bicep, tricep, BR and patella White negative  ROM: Cervical ROM full, shoulder, elbow and wrist ROM full  Tone:  Normal at elbow, wrist and shoulder   Inspection: no atrophy of bicep, FDI or APB noted  Special tests: + b/l axial facet loading  Palpation: tender cervical paraspinals, levator scapula and trapezius    Motor:    Right Left   C4 Shoulder Abduction  5  5   C5 Elbow Flexion    5  5   C6 Wrist Extension  5  5   C7 Elbow Extension   5  5   C8/T1 Hand Intrinsics   5  5                 Gait: gait intact  ROM: limited AROM of the L spine in all planes, full ROM at ankles, knees and hips  Lumbar flexion 90 degrees, extension 50 degrees, side bending 30 degrees.    Sensation: intact to light touch in all dermatomes tested from L2-S1 bilaterally  Reflexes:  0/0 bilateral patella and Achilles  Tone: normal in the b/l knees and hips   Skin: intact  Extremities: No edema in b/l ankles or hands  Provacative:           Right Left   L2/3 Iliacus Hip flexion  5  5   L3/4 Qudratus Femoris Knee Extension  5  5   L4/5 Tib Anterior Ankle Dorsiflexion   5  5   L5/S1 Extensor Hallicus Longus Great toe extension  5  5                           S1/S2 Gastroc/Soleus Plantar Flexion  5  5         Imaging:  Xray cervical spine 1/11/22  FINDINGS:  There are mild degenerative changes at C5-6 and C6-7.  The AP alignment is within normal limits.  The intervertebral foramina shows mild narrowing of the 7th nerve " root level on the right the odontoid is intact.    MRI cervical spine 11/12/19  FINDINGS:  No STIR weighted signal abnormality was noted to suggest an aggressive bone marrow replacement process or recent fracture.  Vertebral body alignment appears adequate.  There is loss the normal cervical lordosis.  Intervertebral disc heights appear well preserved.  Intervertebral disc height loss is noted at the C5-C6 and C6-C7 levels.     At the C2-C3 level, mild disc osteophyte spurring is noted of 2 mm, no significant neural foraminal narrowing is noted.  Minimal central canal narrowing is noted.  At the C3-C4 level, mild disc osteophyte spurring is noted of approximately 2 mm.  Facet arthropathy is noted left slightly greater than right.  Minimal bilateral neural foraminal narrowing is suspected along with minimal central canal narrowing to approximately 11 mm.  At the C4-C5 level, uncovertebral spurring is noted towards the far lateral right and right neural foramen of 3 mm greater than to the left.  Facet arthropathy is noted bilaterally.  Mild right neural foraminal narrowing is noted with minimal left neural foraminal narrowing.  Minimal central canal narrowing is noted.  At the C5-C6 level, disc osteophyte broad-based bulge or protrusion is noted of 4 mm.  Facet arthropathy is noted bilaterally.  Uncovertebral spurring is noted to the left greater than right that may relate to a broad base protrusion far laterally and towards the left neural foramen of 3-4 mm..  Mild right neural foraminal stenosis is noted.  Moderate left neural foraminal stenosis is noted with possible nerve root contact.  Anterior cord contact may be present.  Mild to moderate thecal sac narrowing may be noted to 8 mm.  Fluid appears to be present posterior to the cord.  At the C6-C7 level, broad-based disc osteophyte bulge or protrusion is noted centrally of 3 mm with uncovertebral spurring towards each neural foramen.  Disc osteophyte bulge is  noted towards each neural foramen of 3-4 mm.  Moderate bilateral neural foraminal narrowing is noted.  Mild thecal sac narrowing is noted to 9 mm.  Contact of the exiting nerve roots bilaterally may be present.  At the C7-T1 level, no significant disc bulge, central canal stenosis, or neural foraminal stenosis is noted.     MRI cervical spine 04/15/2022  FINDINGS:  No acute fracture or destructive lesion.  Spinal cord demonstrates normal signal allowing for artifact.     C2-C3 minimal disc bulge without spinal canal or foraminal narrowing.  C3-C4 mild disc bulge without spinal canal or foraminal narrowing.  C4-C5 mild disc bulge, anterior spinal cord contact, no spinal canal or foraminal narrowing.  C5-C6 moderate disc space narrowing, demonstrates a central to left paracentral broad-based protrusion which extends 2.8 mm beyond the disc plane compared to 1.5-2 mm on the prior, anterior spinal cord contact, flattening, left-sided lateral recess and moderate left foraminal narrowing with nerve root contact possible impingement.  C6-C7 moderate disc space narrowing, severe broad-based disc bulge, anterior spinal cord contact slightly asymmetric to the right, moderate foraminal narrowing bilaterally.  C7-T1 no significant disc bulge, herniation, spinal canal narrowing, foraminal narrowing.     Surrounding soft tissues normal.     Impression:     C5-C6 broad-based central to left paracentral posterolateral protrusion type herniation which appears slightly more prominent than seen on the prior exam, anterior spinal cord contact, left-sided lateral recess and foraminal narrowing with nerve root contact possible impingement.     C6-C7 severe broad-based disc bulge with moderate foraminal narrowing bilaterally and bilateral nerve root contact.    Labs:  BMP  Lab Results   Component Value Date     09/30/2022    K 4.7 09/30/2022     09/30/2022    CO2 28 09/30/2022    BUN 16 09/30/2022    CREATININE 0.95 09/30/2022     CALCIUM 9.7 09/30/2022    ANIONGAP 10 09/30/2022    ESTGFRAFRICA >60 06/24/2022    EGFRNONAA >60 06/24/2022     Lab Results   Component Value Date    ALT 22 09/30/2022    AST 56 (H) 09/30/2022    ALKPHOS 75 09/30/2022    BILITOT 0.6 09/30/2022       Assessment:   Problem List Items Addressed This Visit    None  Visit Diagnoses       Cervical radiculopathy    -  Primary    DDD (degenerative disc disease), cervical        Relevant Orders    Ambulatory referral/consult to Physical/Occupational Therapy    Neck pain        Cervical spondylosis                62 y.o. year old female with PMH HTN, THERESA who presents with neck pain.  Pain is constant, sharp, aching with radiation to the bilateral shoulders.  She denies any numbness or weakness.    5/19/2023: Laura Aquino returns to the office for follow up.   She returns for follow-up with continued neck pain, 4-8/10, mainly located in her neck with some radiation into her shoulders.  She also reports intermittent severe stabbing pain in her right shoulder blade with no known aggravating or alleviating factors.  She denies any significant pain radiating down her arms but does report numbness and tingling in bilateral hands that is worse at night when sleeping.  She denies any weakness or any new changes to her bowel or bladder function.  She previously had some worsening lower back pain however now her back pain has significantly improved.    -  on exam she has full strength in upper and lower extremities and intact sensation to light touch.  -   We reviewed her cervical MRI in clinic today and is consistent with C5-C6 broad-based central to left paracentral posterolateral protrusion type herniation which appears slightly more prominent than seen on the prior exam, anterior spinal cord contact, left-sided lateral recess and foraminal narrowing with nerve root contact possible impingement. C6-C7 severe broad-based disc bulge with moderate foraminal narrowing  bilaterally and bilateral nerve root contact.  -  her pain is limiting her mobility interfering with her ADLs.  -   She denies any significant radicular pain and I believe her numbness and tingling is likely from carpal tunnel as she found some relief with bracing however not complete.  -   At this time I think it is best to maximize conservative therapy with formal physical therapy.  I have placed orders today with emphasis on dry needling as I feel like she has a strong myofascial component to her pain.  -   Prescription for Lyrica 75mg  nightly for 1st week and then b.I.d. thereafter if toleraed  sent to Dr. Stack today for approval.  I believe this will be helpful for any neuropathic component of her pain.  She reports trying gabapentin in the past but did not tolerate.  -   Follow-up in 8 weeks or sooner if needed.  If she fails to get relief with physical therapy can consider cervical epidural or potential cervical diagnostic medial branch blocks.      :   Reviewed      This note was completed with dictation software and grammatical errors may exist.

## 2023-05-30 ENCOUNTER — TELEPHONE (OUTPATIENT)
Dept: OPHTHALMOLOGY | Facility: CLINIC | Age: 63
End: 2023-05-30
Payer: COMMERCIAL

## 2023-05-30 ENCOUNTER — PATIENT MESSAGE (OUTPATIENT)
Dept: OPHTHALMOLOGY | Facility: CLINIC | Age: 63
End: 2023-05-30
Payer: COMMERCIAL

## 2023-07-26 PROBLEM — R07.0 THROAT PAIN: Status: RESOLVED | Noted: 2020-09-16 | Resolved: 2023-07-26

## 2023-07-26 PROBLEM — G62.9 POLYNEUROPATHY: Status: ACTIVE | Noted: 2023-07-26

## 2023-07-26 PROBLEM — G44.209 TENSION TYPE HEADACHE: Status: ACTIVE | Noted: 2019-03-23

## 2023-07-26 PROBLEM — R73.03 PREDIABETES: Status: ACTIVE | Noted: 2019-03-22

## 2023-07-26 PROBLEM — R07.2 PRECORDIAL PAIN: Status: RESOLVED | Noted: 2019-10-14 | Resolved: 2023-07-26

## 2023-07-26 PROBLEM — R63.4 WEIGHT LOSS: Status: RESOLVED | Noted: 2020-11-06 | Resolved: 2023-07-26

## 2023-07-26 PROBLEM — E66.09 CLASS 1 OBESITY DUE TO EXCESS CALORIES WITH BODY MASS INDEX (BMI) OF 34.0 TO 34.9 IN ADULT: Status: ACTIVE | Noted: 2019-03-22

## 2023-07-26 PROBLEM — G56.00 CARPAL TUNNEL SYNDROME: Status: ACTIVE | Noted: 2023-07-26

## 2023-08-03 ENCOUNTER — TELEPHONE (OUTPATIENT)
Dept: ORTHOPEDICS | Facility: CLINIC | Age: 63
End: 2023-08-03
Payer: COMMERCIAL

## 2023-08-16 ENCOUNTER — HOSPITAL ENCOUNTER (OUTPATIENT)
Dept: RADIOLOGY | Facility: HOSPITAL | Age: 63
Discharge: HOME | End: 2023-08-16
Attending: ORTHOPAEDIC SURGERY
Payer: COMMERCIAL

## 2023-08-16 ENCOUNTER — OFFICE VISIT (OUTPATIENT)
Dept: ORTHOPEDICS | Facility: CLINIC | Age: 63
End: 2023-08-16
Payer: COMMERCIAL

## 2023-08-16 VITALS — WEIGHT: 193 LBS | HEIGHT: 65 IN | BODY MASS INDEX: 32.15 KG/M2

## 2023-08-16 DIAGNOSIS — M79.642 PAIN IN BOTH HANDS: ICD-10-CM

## 2023-08-16 DIAGNOSIS — M72.0 DUPUYTREN'S CONTRACTURE OF BOTH HANDS: ICD-10-CM

## 2023-08-16 DIAGNOSIS — M79.642 PAIN IN BOTH HANDS: Primary | ICD-10-CM

## 2023-08-16 DIAGNOSIS — R22.32 MASS OF LEFT WRIST: ICD-10-CM

## 2023-08-16 DIAGNOSIS — M79.641 PAIN IN BOTH HANDS: ICD-10-CM

## 2023-08-16 DIAGNOSIS — R22.32 MASS OF LEFT WRIST: Primary | ICD-10-CM

## 2023-08-16 DIAGNOSIS — M79.641 PAIN IN BOTH HANDS: Primary | ICD-10-CM

## 2023-08-16 PROCEDURE — 73130 X-RAY EXAM OF HAND: CPT | Mod: 26,LT,, | Performed by: RADIOLOGY

## 2023-08-16 PROCEDURE — 99204 OFFICE O/P NEW MOD 45 MIN: CPT | Mod: S$GLB,,, | Performed by: ORTHOPAEDIC SURGERY

## 2023-08-16 PROCEDURE — 99999 PR PBB SHADOW E&M-EST. PATIENT-LVL IV: CPT | Mod: PBBFAC,,, | Performed by: ORTHOPAEDIC SURGERY

## 2023-08-16 PROCEDURE — 99999 PR PBB SHADOW E&M-EST. PATIENT-LVL IV: ICD-10-PCS | Mod: PBBFAC,,, | Performed by: ORTHOPAEDIC SURGERY

## 2023-08-16 PROCEDURE — 3008F PR BODY MASS INDEX (BMI) DOCUMENTED: ICD-10-PCS | Mod: CPTII,S$GLB,, | Performed by: ORTHOPAEDIC SURGERY

## 2023-08-16 PROCEDURE — 73130 PR  X-RAY HAND 3+ VW: ICD-10-PCS | Mod: 26,LT,, | Performed by: RADIOLOGY

## 2023-08-16 PROCEDURE — 73130 X-RAY EXAM OF HAND: CPT | Mod: TC,50,PO

## 2023-08-16 PROCEDURE — 99204 PR OFFICE/OUTPT VISIT, NEW, LEVL IV, 45-59 MIN: ICD-10-PCS | Mod: S$GLB,,, | Performed by: ORTHOPAEDIC SURGERY

## 2023-08-16 PROCEDURE — 1159F PR MEDICATION LIST DOCUMENTED IN MEDICAL RECORD: ICD-10-PCS | Mod: CPTII,S$GLB,, | Performed by: ORTHOPAEDIC SURGERY

## 2023-08-16 PROCEDURE — 73130 X-RAY EXAM OF HAND: CPT | Mod: 26,RT,, | Performed by: RADIOLOGY

## 2023-08-16 PROCEDURE — 3008F BODY MASS INDEX DOCD: CPT | Mod: CPTII,S$GLB,, | Performed by: ORTHOPAEDIC SURGERY

## 2023-08-16 PROCEDURE — 1159F MED LIST DOCD IN RCRD: CPT | Mod: CPTII,S$GLB,, | Performed by: ORTHOPAEDIC SURGERY

## 2023-08-16 RX ORDER — MUPIROCIN 20 MG/G
OINTMENT TOPICAL
Status: CANCELLED | OUTPATIENT
Start: 2023-08-16

## 2023-08-16 NOTE — PROGRESS NOTES
8/16/2023    Chief Complaint:  Chief Complaint   Patient presents with    Right Hand - Pain    Left Hand - Pain       HPI:  Laura Aquino is a 63 y.o. female, who presents to clinic today she has a history of bilateral hand pain.  She also has some nodules in both of the hands at the palm.  States that the left has more than the right.  States that she does have some stiffness of multiple fingers and occasionally has some locking of multiple fingers.  States that the right middle finger was the most significant.  She is here today for treatment evaluation.    PMHX:  Past Medical History:   Diagnosis Date    Abdominal wall mass     Abnormal results of liver function studies     AD (atopic dermatitis)     Diabetes mellitus     Diverticulosis     Hepatic steatosis     Hyperlipidemia     Hypertension     Open angle glaucoma suspect with borderline findings at low risk     Precordial pain 10/2019    Skin sensation disturbance        PSHX:  Past Surgical History:   Procedure Laterality Date    ANGIOGRAM, CORONARY, WITH LEFT HEART CATHETERIZATION N/A 10/14/2019    Procedure: Angiogram, Coronary, with Left Heart Cath;  Surgeon: Franki Carroll MD;  Location: [facility] CATH;  Service: Cardiology;  Laterality: N/A;    COLONOSCOPY  08/12/2020    Dr. Lopez, in procedures: diverticulosis, fair prep; repeat in 5 years for screening    ESOPHAGOGASTRODUODENOSCOPY N/A 11/6/2020    Procedure: EGD (ESOPHAGOGASTRODUODENOSCOPY);  Surgeon: Hermilo Quispe Jr., MD;  Location: [facility] ENDO;  Service: Endoscopy;  Laterality: N/A;    HYSTERECTOMY  1999    Complete    LEFT HEART CATHETERIZATION Left 10/14/2019    Procedure: Left heart cath;  Surgeon: Franki Carroll MD;  Location: [facility] CATH;  Service: Cardiology;  Laterality: Left;    LIPOMA RESECTION      TONSILLECTOMY Bilateral 9/16/2020    Procedure: TONSILLECTOMY;  Surgeon: Pk Kirkpatrick MD;  Location: [facility];  Service: ENT;  Laterality: Bilateral;    TOTAL ABDOMINAL HYSTERECTOMY  W/ BILATERAL SALPINGOOPHORECTOMY Bilateral     w/ hysterectomy    TUBAL LIGATION         FMHX:  Family History   Problem Relation Age of Onset    Asthma Mother     Diabetes Mother     Cataracts Mother     Anxiety disorder Sister     Anxiety disorder Brother     Diabetes Father     Sarcoidosis Daughter     HIV Son     No Known Problems Son     No Known Problems Son     Colon cancer Neg Hx     Colon polyps Neg Hx     Crohn's disease Neg Hx     Ulcerative colitis Neg Hx     Stomach cancer Neg Hx     Esophageal cancer Neg Hx     Glaucoma Neg Hx     Macular degeneration Neg Hx     Retinal detachment Neg Hx        SOCHX:  Social History     Tobacco Use    Smoking status: Former     Current packs/day: 0.00     Average packs/day: 1 pack/day for 5.0 years (5.0 ttl pk-yrs)     Types: Cigarettes     Start date: 10/1/1988     Quit date: 10/1/1993     Years since quittin.8    Smokeless tobacco: Never   Substance Use Topics    Alcohol use: No     Alcohol/week: 0.0 standard drinks of alcohol       ALLERGIES:  Patient has no known allergies.    CURRENT MEDICATIONS:  Current Outpatient Medications on File Prior to Visit   Medication Sig Dispense Refill    ALPRAZolam (XANAX) 0.5 MG tablet Take 1 tablet (0.5 mg total) by mouth daily as needed for Anxiety. 30 tablet 0    atorvastatin (LIPITOR) 40 MG tablet Take 1 tablet (40 mg total) by mouth once daily. 90 tablet 4    cholecalciferol, vitamin D3, (VITAMIN D3) 25 mcg (1,000 unit) capsule Take 2 capsules (2,000 Units total) by mouth once daily. 90 capsule 3    latanoprost 0.005 % ophthalmic solution Place 1 drop into both eyes every evening. 2.5 mL 11    magnesium oxide (MAG-OX) 400 mg (241.3 mg magnesium) tablet Take 1 tablet (400 mg total) by mouth once daily.  0    meloxicam (MOBIC) 15 MG tablet Take 1 tablet (15 mg total) by mouth once daily. 90 tablet 1    methylPREDNISolone (MEDROL DOSEPACK) 4 mg tablet use as directed 21 each 0    mv-mn/iron/folic acid/herb 190 (VITAMIN  "D3 COMPLETE ORAL) Take by mouth once daily.      pantoprazole (PROTONIX) 40 MG tablet Take 40 mg by mouth every morning.       No current facility-administered medications on file prior to visit.       REVIEW OF SYSTEMS:  Review of Systems   Constitutional: Negative.    HENT: Negative.     Eyes: Negative.    Respiratory: Negative.     Cardiovascular: Negative.    Gastrointestinal: Negative.    Genitourinary: Negative.    Musculoskeletal:  Positive for joint pain.   Skin: Negative.    Neurological: Negative.    Endo/Heme/Allergies: Negative.    Psychiatric/Behavioral: Negative.       GENERAL PHYSICAL EXAM:   Ht 5' 5" (1.651 m)   Wt 87.5 kg (193 lb)   BMI 32.12 kg/m²    GEN: well developed, well nourished, no acute distress   HENT: Normocephalic, atraumatic   EYES: No discharge, conjunctiva normal   NECK: Supple, non-tender   PULM: No wheezing, no respiratory distress   CV: RRR   ABD: Soft, non-tender    ORTHO EXAM:   Examination of bilateral hands reveals that there are no major skin changes.  There is no significant edema.  Palpation reveals that there are multiple areas of nodular disease in the palm.  There was also some thickening of the A1 pulleys of the left middle and ring fingers well as the right middle finger.  Flexion and extension of those fingers does not reveal active triggering today but there is a sense of tightness.  She also complains about a mass over the ulnar side of the left wrist.  Examination at that site reveals that there is a mobile relatively well defined mass overlying the region of the ECU tendon.  This mass measures 3 x 3 cm in size.  It appears to be fluid-filled but does have a more from component to it.  She does report grossly intact sensation in the median radial and ulnar distribution.  She has negative Durkan's test.  She does have 2+ radial pulses    RADIOLOGY:   X-rays of bilateral hands were taken in clinic today there is noted to be mild degenerative changes throughout the " hand and wrist.  There is an abnormality overlying the neck of the index proximal phalanx on the left.  This is not clinically correlated.  There is noted be a soft tissue mass overlying the dorsum of the left ECU and ulnar head region which has no underlying bony changes    ASSESSMENT:   Left dorsal wrist mass, multiple triggering fingers which are mild    PLAN:  1. Patient can continue meloxicam     2. I have discussed treatment for the mass on the left wrist.  I have discussed observation versus aspiration versus excision.  At this time the patient states that it is enlarging and therefore she would like to have it removed.  I do agree with the assessment and have obtained informed consent for the procedure     3. I will set her up to undergo a left wrist mass excision under general anesthesia    4.  She will follow up with me 2 weeks postoperatively

## 2023-08-16 NOTE — PATIENT INSTRUCTIONS
Surgery Instructions:     Your surgery is scheduled on 08/24/23 at the surgery center: [address] [facility], 1st [facility], second entrance.    The pre-op department will be in contact with you prior to your procedure to review medications and instructions.       Nothing to eat or drink after midnight prior to day of surgery.    The surgery center will contact you the day prior to surgery to advise you of your arrival time for surgery.     Your post op appointment is scheduled on 09/06/23 @ 2:20pm.

## 2023-08-17 PROBLEM — I10 ESSENTIAL HYPERTENSION: Chronic | Status: ACTIVE | Noted: 2019-03-27

## 2023-08-17 PROBLEM — I62.9 INTRACRANIAL HEMORRHAGE: Status: ACTIVE | Noted: 2023-08-17

## 2023-08-31 ENCOUNTER — TELEPHONE (OUTPATIENT)
Dept: NEUROLOGY | Facility: CLINIC | Age: 63
End: 2023-08-31
Payer: COMMERCIAL

## 2023-08-31 NOTE — TELEPHONE ENCOUNTER
----- Message from Luana Noe RN sent at 8/31/2023 12:09 PM CDT -----  Regarding: [facility] ER visit f/u 8/31/2023 Dx: Headache s/p CVA  Please call patient to schedule an appointment for further evaluation/treatment of headache. Patient previously diagnosed with brain hemorrhage.     Thanks,    Luana Noe RN, BSN  ED Navigator/Case Management  [phone]

## 2023-08-31 NOTE — TELEPHONE ENCOUNTER
----- Message from Luana Noe RN sent at 8/31/2023 12:09 PM CDT -----  Regarding: [facility] ER visit f/u 8/31/2023 Dx: Headache s/p CVA  Please call patient to schedule an appointment for further evaluation/treatment of headache. Patient previously diagnosed with brain hemorrhage.     Thanks,    Luana Noe RN, BSN  ED Navigator/Case Management  [phone]

## 2023-08-31 NOTE — TELEPHONE ENCOUNTER
Spoke to the pt, informed her that I scheduled her an appt with Catalina Guzmán on 9/7/23 at 1400.  Date, time and location discussed.

## 2023-08-31 NOTE — TELEPHONE ENCOUNTER
----- Message from Rosamaria Leal LPN sent at 8/30/2023  4:39 PM CDT -----  Contact: Reddy Altamirano made note to follow up in clinic for 2 wk f/u    ----- Message -----  From: Urvashi Wu, Patient Care Assistant  Sent: 8/30/2023   4:14 PM CDT  To: [facility]WEENE[facility] Staff    Type: Needs Medical Advice    Who Called: Pt  Best Call Back Number: [phone]  Inquiry/Question: Pt is needing to schedule for intracranial hemorrhage but the referral is incorrect in Epic. Please advise.  Thank you~

## 2023-08-31 NOTE — TELEPHONE ENCOUNTER
Spoke with the pt,pt was instructed to schedule a hospital follow up with Dr Dominguez once discharged. The pt reports Dr Phillips office does not accept her insurance. Pt to be scheduled with [facility]. I attempted to schedule hospital follow up. Pt reports she did not feel well, she began having a headache and stiff neck approximately 5 minutes ago. States she thinks she needs to got o ER. I advised pt to report to ER promptly, offered to call ambulance, pt declined stating she has a family member present that will bring her. I advised we will call back to schedule follow-up after ER eval.

## 2023-09-07 ENCOUNTER — TELEPHONE (OUTPATIENT)
Dept: ORTHOPEDICS | Facility: CLINIC | Age: 63
End: 2023-09-07
Payer: COMMERCIAL

## 2023-09-07 ENCOUNTER — OFFICE VISIT (OUTPATIENT)
Dept: NEUROLOGY | Facility: CLINIC | Age: 63
End: 2023-09-07
Payer: COMMERCIAL

## 2023-09-07 VITALS
HEART RATE: 71 BPM | RESPIRATION RATE: 17 BRPM | WEIGHT: 192 LBS | HEIGHT: 65 IN | BODY MASS INDEX: 31.99 KG/M2 | DIASTOLIC BLOOD PRESSURE: 69 MMHG | SYSTOLIC BLOOD PRESSURE: 130 MMHG

## 2023-09-07 DIAGNOSIS — I10 ESSENTIAL HYPERTENSION: Chronic | ICD-10-CM

## 2023-09-07 DIAGNOSIS — G89.29 CHRONIC RIGHT SHOULDER PAIN: Primary | ICD-10-CM

## 2023-09-07 DIAGNOSIS — I62.9 INTRACRANIAL HEMORRHAGE: ICD-10-CM

## 2023-09-07 DIAGNOSIS — G56.02 CARPAL TUNNEL SYNDROME OF LEFT WRIST: ICD-10-CM

## 2023-09-07 DIAGNOSIS — M25.511 CHRONIC RIGHT SHOULDER PAIN: Primary | ICD-10-CM

## 2023-09-07 DIAGNOSIS — G44.209 TENSION-TYPE HEADACHE, NOT INTRACTABLE, UNSPECIFIED CHRONICITY PATTERN: ICD-10-CM

## 2023-09-07 PROCEDURE — 99215 PR OFFICE/OUTPT VISIT, EST, LEVL V, 40-54 MIN: ICD-10-PCS | Mod: S$GLB,,, | Performed by: NURSE PRACTITIONER

## 2023-09-07 PROCEDURE — 1159F PR MEDICATION LIST DOCUMENTED IN MEDICAL RECORD: ICD-10-PCS | Mod: CPTII,S$GLB,, | Performed by: NURSE PRACTITIONER

## 2023-09-07 PROCEDURE — 99999 PR PBB SHADOW E&M-EST. PATIENT-LVL V: CPT | Mod: PBBFAC,,, | Performed by: NURSE PRACTITIONER

## 2023-09-07 PROCEDURE — 3008F BODY MASS INDEX DOCD: CPT | Mod: CPTII,S$GLB,, | Performed by: NURSE PRACTITIONER

## 2023-09-07 PROCEDURE — 99999 PR PBB SHADOW E&M-EST. PATIENT-LVL V: ICD-10-PCS | Mod: PBBFAC,,, | Performed by: NURSE PRACTITIONER

## 2023-09-07 PROCEDURE — 3075F PR MOST RECENT SYSTOLIC BLOOD PRESS GE 130-139MM HG: ICD-10-PCS | Mod: CPTII,S$GLB,, | Performed by: NURSE PRACTITIONER

## 2023-09-07 PROCEDURE — 3008F PR BODY MASS INDEX (BMI) DOCUMENTED: ICD-10-PCS | Mod: CPTII,S$GLB,, | Performed by: NURSE PRACTITIONER

## 2023-09-07 PROCEDURE — 1111F PR DISCHARGE MEDS RECONCILED W/ CURRENT OUTPATIENT MED LIST: ICD-10-PCS | Mod: CPTII,S$GLB,, | Performed by: NURSE PRACTITIONER

## 2023-09-07 PROCEDURE — 99215 OFFICE O/P EST HI 40 MIN: CPT | Mod: S$GLB,,, | Performed by: NURSE PRACTITIONER

## 2023-09-07 PROCEDURE — 3044F HG A1C LEVEL LT 7.0%: CPT | Mod: CPTII,S$GLB,, | Performed by: NURSE PRACTITIONER

## 2023-09-07 PROCEDURE — 3075F SYST BP GE 130 - 139MM HG: CPT | Mod: CPTII,S$GLB,, | Performed by: NURSE PRACTITIONER

## 2023-09-07 PROCEDURE — 3078F PR MOST RECENT DIASTOLIC BLOOD PRESSURE < 80 MM HG: ICD-10-PCS | Mod: CPTII,S$GLB,, | Performed by: NURSE PRACTITIONER

## 2023-09-07 PROCEDURE — 1159F MED LIST DOCD IN RCRD: CPT | Mod: CPTII,S$GLB,, | Performed by: NURSE PRACTITIONER

## 2023-09-07 PROCEDURE — 3078F DIAST BP <80 MM HG: CPT | Mod: CPTII,S$GLB,, | Performed by: NURSE PRACTITIONER

## 2023-09-07 PROCEDURE — 1111F DSCHRG MED/CURRENT MED MERGE: CPT | Mod: CPTII,S$GLB,, | Performed by: NURSE PRACTITIONER

## 2023-09-07 PROCEDURE — 3044F PR MOST RECENT HEMOGLOBIN A1C LEVEL <7.0%: ICD-10-PCS | Mod: CPTII,S$GLB,, | Performed by: NURSE PRACTITIONER

## 2023-09-07 RX ORDER — TIZANIDINE 4 MG/1
4 TABLET ORAL NIGHTLY PRN
Qty: 10 TABLET | Refills: 0 | Status: SHIPPED | OUTPATIENT
Start: 2023-09-07 | End: 2023-10-09
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
## 2023-09-07 NOTE — PATIENT INSTRUCTIONS
Try taking the magnesium daily -- magnesium oxide 400 mg twice per day    Continue the same Keppra for now. We will get a seizure test called an EEG to ensure no abnormal brain waves etc to raise concern for seizure.     Return to the ED for any new stroke like symptoms or for any worsening of your headache    Avoid blood thinners, like aspirin.     Avoid excess caffeine.     Referral to ortho for the R shoulder issue     Try muscle relaxant to help with the neck tension, could also help with referred headache     Follow up in about 4 weeks

## 2023-09-11 ENCOUNTER — TELEPHONE (OUTPATIENT)
Dept: NEUROLOGY | Facility: CLINIC | Age: 63
End: 2023-09-11
Payer: COMMERCIAL

## 2023-09-11 NOTE — TELEPHONE ENCOUNTER
"I was messaged from phone room and pt was stating "stated she has been calling to clinic to see if the letter Catalina Sheffield, NP was writing for her to stay out of work was completed, stated she was told to ask for you? Do you know anything about this letter?"  I have no idea about a letter. Let her know I would ask valentemaddie about it only bc I dont know about catalina ever telling a pt what pt is stating. Gave understanding.   "

## 2023-09-11 NOTE — TELEPHONE ENCOUNTER
----- Message from Elsy Calderon sent at 9/11/2023 12:19 PM CDT -----  Contact: Self  Type:  Patient Returning Call    Who Called:  Patient   Who Left Message for Patient:  N/A-asking to speak to the nurse  Does the patient know what this is regarding?:  letter for work, stating she needs to be off until after she see's her again in October  Best Call Back Number:  [phone]  Additional Information:  Please call pt back to advise. Thank You.

## 2023-09-14 NOTE — ASSESSMENT & PLAN NOTE
Diagnostic testing from recent hospitalization reviewed with patient and family  - Imaging c/w L parietal ICH, most recent CTH 8/31 shows decrease in size compared to initial and no sig mass effect  - no underlying lesion or vascular anomaly noted on contrasted MRI / CTA head, no SWI changes to suggest prior hemorrhage as in CAA  - CSF clear / colorless, not c/w SAH  - BP typically well controlled    Etiology of spontaneous ICH remains unclear  Typically, would only continue AED for 7 days as prophylaxis, but given reports of fluctuating mental status and other transient sx, will continue and obtain routine EEG to evaluate for any epileptic changes. Of note, many of the transient symptoms reported today seem chronic based on EMR review.     I have advised her not to drive until further evaluation as above   Continue BP Rx  Avoid blood thinners like ASA, DOAC  Avoid caffeine

## 2023-09-14 NOTE — ASSESSMENT & PLAN NOTE
Reports chronic migraine with aura, controlled   Now has dull daily tension type HA post ICH  Question some myofascial component  Trial tizanidine for spasm  Mg daily for migraine prevention

## 2023-09-18 ENCOUNTER — TELEPHONE (OUTPATIENT)
Dept: NEUROSURGERY | Facility: CLINIC | Age: 63
End: 2023-09-18
Payer: COMMERCIAL

## 2023-09-18 NOTE — TELEPHONE ENCOUNTER
Pt. Called confused as to whether she should keep her appt. Scheduled for 9/19/2023. Per Rosamaria, it was ok to keep appt. Pt. Then stated, due to having another appointment scheduled on the same day/time she would like to have appt. Rescheduled to a later date. Done NM.

## 2023-10-03 ENCOUNTER — OFFICE VISIT (OUTPATIENT)
Dept: NEUROSURGERY | Facility: CLINIC | Age: 63
End: 2023-10-03
Payer: COMMERCIAL

## 2023-10-03 ENCOUNTER — TELEPHONE (OUTPATIENT)
Dept: NEUROSURGERY | Facility: CLINIC | Age: 63
End: 2023-10-03
Payer: COMMERCIAL

## 2023-10-03 VITALS
SYSTOLIC BLOOD PRESSURE: 148 MMHG | RESPIRATION RATE: 18 BRPM | HEART RATE: 68 BPM | DIASTOLIC BLOOD PRESSURE: 87 MMHG | HEIGHT: 65 IN | WEIGHT: 192 LBS | BODY MASS INDEX: 31.99 KG/M2

## 2023-10-03 DIAGNOSIS — I62.00 NONTRAUMATIC SUBDURAL HEMORRHAGE, UNSPECIFIED: Primary | ICD-10-CM

## 2023-10-03 DIAGNOSIS — I62.9 INTRACRANIAL HEMORRHAGE: ICD-10-CM

## 2023-10-03 PROCEDURE — 3008F BODY MASS INDEX DOCD: CPT | Mod: CPTII,S$GLB,, | Performed by: NEUROLOGICAL SURGERY

## 2023-10-03 PROCEDURE — 3079F DIAST BP 80-89 MM HG: CPT | Mod: CPTII,S$GLB,, | Performed by: NEUROLOGICAL SURGERY

## 2023-10-03 PROCEDURE — 99215 OFFICE O/P EST HI 40 MIN: CPT | Mod: S$GLB,,, | Performed by: NEUROLOGICAL SURGERY

## 2023-10-03 PROCEDURE — 3008F PR BODY MASS INDEX (BMI) DOCUMENTED: ICD-10-PCS | Mod: CPTII,S$GLB,, | Performed by: NEUROLOGICAL SURGERY

## 2023-10-03 PROCEDURE — 3079F PR MOST RECENT DIASTOLIC BLOOD PRESSURE 80-89 MM HG: ICD-10-PCS | Mod: CPTII,S$GLB,, | Performed by: NEUROLOGICAL SURGERY

## 2023-10-03 PROCEDURE — 3077F SYST BP >= 140 MM HG: CPT | Mod: CPTII,S$GLB,, | Performed by: NEUROLOGICAL SURGERY

## 2023-10-03 PROCEDURE — 3077F PR MOST RECENT SYSTOLIC BLOOD PRESSURE >= 140 MM HG: ICD-10-PCS | Mod: CPTII,S$GLB,, | Performed by: NEUROLOGICAL SURGERY

## 2023-10-03 PROCEDURE — 3044F PR MOST RECENT HEMOGLOBIN A1C LEVEL <7.0%: ICD-10-PCS | Mod: CPTII,S$GLB,, | Performed by: NEUROLOGICAL SURGERY

## 2023-10-03 PROCEDURE — 3044F HG A1C LEVEL LT 7.0%: CPT | Mod: CPTII,S$GLB,, | Performed by: NEUROLOGICAL SURGERY

## 2023-10-03 PROCEDURE — 99215 PR OFFICE/OUTPT VISIT, EST, LEVL V, 40-54 MIN: ICD-10-PCS | Mod: S$GLB,,, | Performed by: NEUROLOGICAL SURGERY

## 2023-10-03 NOTE — TELEPHONE ENCOUNTER
Pt returned call/message- informed pt that Dr burgos stated that patient could fly in December, instructed pt to follow up with neurologist for diet and caffiene concerns because of possible seizures. And instructed pt that walking strething is ok for exercise but no gym machines or equipment/weights at this time. Pt verbalized understanding and had no further questions.  MM-LPN

## 2023-10-03 NOTE — PROGRESS NOTES
"Neurosurgery History & Physical    Patient ID: Laura Aquino is a 63 y.o. female.    Chief Complaint   Patient presents with    Establish Care     Patient present today as establish care; image review. States of history of stroke; "pressure" in chest        HPI:  Ms. Aquino is a 63 year old female who was in her usual state of health until 8/16 when she had sudden onset of headache that was accompanied by a "pop" sensation.  She reported to [facility] ER where CT showed hyperdensity in the left frontoparietal area.     She was admitted for observation and the lesion was stable.  CTA revealed no underlying lesion.  She was treated supportively and discharged with Neurology follow-up.    Since that time she has had intermittent continued symptoms. She has had some episodes of right upper extremity symptoms as well as waxing and waning symptoms of dizziness, confusion.      She was initially given anti-seizure medication after being disharged in August but only given 30 days and she is no longer taking Keppra.      She had an EEG on 9/22/2023 without evidence of seizure activity.      She has returned to the ER several times with waxing and waning symptoms.      Her most prominent symtpoms daily now are weakness in both legs, stiffness in her neck, and headache.  She also has a vague feeling of unsteadiness/lightheadedness where she feels like "something is about to go on with her whole body."  These vague feelings come on about twice per day.    In late August she had a few minute episode of her right arm shaking that spontaneously resolved and has not happened since.      Of note, on  review of the mediacl record she has had multiple CT scans, CTA, and MRIs of her brain in the past which did not show any definite abnormality.  She apparently was having right face and arm numbness back in 2019.      Review of Systems   Constitutional:  Negative for activity change and fever.   HENT:  Negative for rhinorrhea, tinnitus, " trouble swallowing and voice change.    Eyes:  Negative for visual disturbance.   Respiratory:  Negative for shortness of breath.    Cardiovascular:  Negative for chest pain.   Gastrointestinal:  Negative for nausea and vomiting.   Endocrine: Negative for cold intolerance, heat intolerance, polydipsia, polyphagia and polyuria.   Genitourinary:  Negative for decreased urine volume, frequency and urgency.   Musculoskeletal:  Negative for back pain, neck pain and neck stiffness.   Neurological:  Positive for headaches. Negative for dizziness, tremors, seizures, syncope, facial asymmetry, speech difficulty, weakness, light-headedness and numbness.   Psychiatric/Behavioral:  Negative for confusion.        Past Medical History:   Diagnosis Date    Abdominal wall mass     Abnormal results of liver function studies     AD (atopic dermatitis)     Diabetes mellitus     Diverticulosis     Hepatic steatosis     Hyperlipidemia     Hypertension     Open angle glaucoma suspect with borderline findings at low risk     Precordial pain 10/2019    Skin sensation disturbance     TIA (transient ischemic attack) 2023     Social History     Socioeconomic History    Marital status:    Tobacco Use    Smoking status: Former     Current packs/day: 0.00     Average packs/day: 1 pack/day for 5.0 years (5.0 ttl pk-yrs)     Types: Cigarettes     Start date: 10/1/1988     Quit date: 10/1/1993     Years since quittin.0    Smokeless tobacco: Never   Substance and Sexual Activity    Alcohol use: No     Alcohol/week: 0.0 standard drinks of alcohol    Drug use: No   Social History Narrative    ** Merged History Encounter **          Family History   Problem Relation Age of Onset    Asthma Mother     Diabetes Mother     Cataracts Mother     Anxiety disorder Sister     Anxiety disorder Brother     Diabetes Father     Sarcoidosis Daughter     HIV Son     No Known Problems Son     No Known Problems Son     Colon cancer Neg Hx     Colon  "polyps Neg Hx     Crohn's disease Neg Hx     Ulcerative colitis Neg Hx     Stomach cancer Neg Hx     Esophageal cancer Neg Hx     Glaucoma Neg Hx     Macular degeneration Neg Hx     Retinal detachment Neg Hx      Review of patient's allergies indicates:  No Known Allergies    Current Outpatient Medications:     ALPRAZolam (XANAX) 0.5 MG tablet, Take 1 tablet (0.5 mg total) by mouth daily as needed for Anxiety., Disp: 30 tablet, Rfl: 0    amLODIPine (NORVASC) 5 MG tablet, Take 1 tablet (5 mg total) by mouth once daily., Disp: 90 tablet, Rfl: 1    meloxicam (MOBIC) 15 MG tablet, Take 1 tablet (15 mg total) by mouth once daily., Disp: 90 tablet, Rfl: 1    NON FORMULARY MEDICATION, Take 1 packet by mouth Every 3 (three) days. **LYPO-SPHERIC MAGNESIUM L-THREONATE 1000MG**, Disp: , Rfl:     omeprazole (PRILOSEC) 40 MG capsule, Take 40 mg by mouth every morning., Disp: , Rfl:     rosuvastatin (CRESTOR) 40 MG Tab, Take 40 mg by mouth every evening., Disp: , Rfl:     levETIRAcetam (KEPPRA) 500 MG Tab, Take 1 tablet (500 mg total) by mouth 2 (two) times daily., Disp: 60 tablet, Rfl: 0    meclizine (ANTIVERT) 25 mg tablet, Take 1 tablet (25 mg total) by mouth every 8 (eight) hours as needed for Dizziness., Disp: 15 tablet, Rfl: 0  Blood pressure (!) 148/87, pulse 68, resp. rate 18, height 5' 5" (1.651 m), weight 87.1 kg (192 lb).      Neurologic Exam     Mental Status   Oriented to person, place, and time.   Attention: normal.   Speech: speech is normal   Level of consciousness: alert  Knowledge: good.     Cranial Nerves     CN III, IV, VI   Extraocular motions are normal.     CN VII   Facial expression full, symmetric.     Motor Exam   Muscle bulk: normal  Overall muscle tone: normal    Strength   Strength 5/5 throughout.     Sensory Exam   Light touch normal.     Gait, Coordination, and Reflexes     Gait  Gait: normal    Coordination   Romberg: negative      Physical Exam  Eyes:      Extraocular Movements: EOM normal. "   Neurological:      Mental Status: She is oriented to person, place, and time.      Motor: Motor strength is normal.     Coordination: Romberg Test normal.      Gait: Gait is intact.   Psychiatric:         Speech: Speech normal.       Imaging:  CT head dated 09/28/2023 is personally reviewed and discussed with the patient.  There has been interval increase in hypodensity surrounding the prior hemorrhage site while the hyperdensity has improved.    Assessment/Plan:   Ms. Aquino is a 63-year-old female with sudden onset of headache and hemorrhage in August of 2023.  She has had cyclic episodes of multiple various neurologic symptoms.  Imaging did not find an underlying vascular or tumor lesion.  She was treated supportively and now returns for review of repeat imaging.    There is increasing edema in the region of her prior hemorrhage.  I am not sure of the etiology of this.  I would not necessarily expect that edema would increase as the hemorrhage heals.  I will order a repeat MRI of the brain with and without contrast to further rule out an underlying tumor or growth.  Patient will also be seeing Neurology for further workup to rule out other etiologies for cyclic neurologic dysfunction and hemorrhage.  We will see her back after MRI of the brain with and without contrast is complete.

## 2023-10-09 ENCOUNTER — OFFICE VISIT (OUTPATIENT)
Dept: NEUROLOGY | Facility: CLINIC | Age: 63
End: 2023-10-09
Payer: COMMERCIAL

## 2023-10-09 VITALS
HEART RATE: 79 BPM | HEIGHT: 65 IN | RESPIRATION RATE: 16 BRPM | BODY MASS INDEX: 31.68 KG/M2 | SYSTOLIC BLOOD PRESSURE: 125 MMHG | DIASTOLIC BLOOD PRESSURE: 90 MMHG | WEIGHT: 190.13 LBS

## 2023-10-09 DIAGNOSIS — G47.33 OBSTRUCTIVE SLEEP APNEA: ICD-10-CM

## 2023-10-09 DIAGNOSIS — G47.00 INSOMNIA, UNSPECIFIED TYPE: Primary | ICD-10-CM

## 2023-10-09 DIAGNOSIS — I10 ESSENTIAL HYPERTENSION: ICD-10-CM

## 2023-10-09 DIAGNOSIS — I62.9 INTRACRANIAL HEMORRHAGE: ICD-10-CM

## 2023-10-09 DIAGNOSIS — R07.9 CHEST PAIN, UNSPECIFIED TYPE: ICD-10-CM

## 2023-10-09 PROCEDURE — 3080F PR MOST RECENT DIASTOLIC BLOOD PRESSURE >= 90 MM HG: ICD-10-PCS | Mod: CPTII,S$GLB,, | Performed by: NURSE PRACTITIONER

## 2023-10-09 PROCEDURE — 3074F PR MOST RECENT SYSTOLIC BLOOD PRESSURE < 130 MM HG: ICD-10-PCS | Mod: CPTII,S$GLB,, | Performed by: NURSE PRACTITIONER

## 2023-10-09 PROCEDURE — 3074F SYST BP LT 130 MM HG: CPT | Mod: CPTII,S$GLB,, | Performed by: NURSE PRACTITIONER

## 2023-10-09 PROCEDURE — 99999 PR PBB SHADOW E&M-EST. PATIENT-LVL V: CPT | Mod: PBBFAC,,, | Performed by: NURSE PRACTITIONER

## 2023-10-09 PROCEDURE — 99999 PR PBB SHADOW E&M-EST. PATIENT-LVL V: ICD-10-PCS | Mod: PBBFAC,,, | Performed by: NURSE PRACTITIONER

## 2023-10-09 PROCEDURE — 3008F BODY MASS INDEX DOCD: CPT | Mod: CPTII,S$GLB,, | Performed by: NURSE PRACTITIONER

## 2023-10-09 PROCEDURE — 3008F PR BODY MASS INDEX (BMI) DOCUMENTED: ICD-10-PCS | Mod: CPTII,S$GLB,, | Performed by: NURSE PRACTITIONER

## 2023-10-09 PROCEDURE — 99215 PR OFFICE/OUTPT VISIT, EST, LEVL V, 40-54 MIN: ICD-10-PCS | Mod: S$GLB,,, | Performed by: NURSE PRACTITIONER

## 2023-10-09 PROCEDURE — 3044F PR MOST RECENT HEMOGLOBIN A1C LEVEL <7.0%: ICD-10-PCS | Mod: CPTII,S$GLB,, | Performed by: NURSE PRACTITIONER

## 2023-10-09 PROCEDURE — 99215 OFFICE O/P EST HI 40 MIN: CPT | Mod: S$GLB,,, | Performed by: NURSE PRACTITIONER

## 2023-10-09 PROCEDURE — 3044F HG A1C LEVEL LT 7.0%: CPT | Mod: CPTII,S$GLB,, | Performed by: NURSE PRACTITIONER

## 2023-10-09 PROCEDURE — 3080F DIAST BP >= 90 MM HG: CPT | Mod: CPTII,S$GLB,, | Performed by: NURSE PRACTITIONER

## 2023-10-09 RX ORDER — ZONISAMIDE 100 MG/1
CAPSULE ORAL
Qty: 111 CAPSULE | Refills: 0 | Status: SHIPPED | OUTPATIENT
Start: 2023-10-09 | End: 2023-10-24

## 2023-10-09 NOTE — ASSESSMENT & PLAN NOTE
Unable to locate sleep study results, but EMR indicates diagnosis of THERESA. She seems unaware of this?  Nighttime episodes raise concern for THERESA -- she is agreeable to HST now

## 2023-10-09 NOTE — Clinical Note
I see that you ordered a repeat MRI brain on this lady. I am assuming to further evaluation the increase in hypodensity on recent CT scan.  Just wondering your thoughts regarding etiology? I am considering RCVS although her vascular imaging was read as normal. She hasn't had any distinct clinical change in headaches, etc that I think would warrant repeating the vascular imaging unless you think that it would be useful?   Thanks, Catalina

## 2023-10-09 NOTE — ASSESSMENT & PLAN NOTE
Recent CTH dated 9/28/23 personally reviewed. No new or worsening hemorrhage, but increased edema of uncertain etiology is noted in the L parietal region compared to prior CTH.     ?hemorrhagic neoplasm although not apparent on prior recent imaging. Dr. Veras has ordered a repeat MRI w wo for further evaluation    Consider RCVS as etiology of the ICH if repeat imaging unrevealing. No worsening or change in her HA since initial admission. BP is well controlled and she has been avoiding caffeine, not on any serotonergic medications. Will discuss with Dr. Veras -- consider repeating vascular imaging for comparison. Considering changing amlodipine to verapamil based on course. Continue mag oxide 400 mg BID.     Although EEG normal and no true clinical sx to suggest seizures, recommend she continue AED given changes noted on CTH until MRI is completed. Will change to Zonegran due to possible mood SE from Keppra and for benefit of possible HA improvement.
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
## 2023-10-09 NOTE — Clinical Note
Please tell her that I want her to start taking magnesium oxide 400 mg twice per day to see if this helps the headaches. We talked about this last visit, but I forgot to ask her if she has been doing this or not.  Is it possible to do the MRI sooner than 11/1?

## 2023-10-09 NOTE — PATIENT INSTRUCTIONS
I would like you to continue the anti seizure meds until after the repeat MRI just incase there is indeed some swelling present that could increase the risk for seizures.     We will switch you to a medication called zonisamide that will help prevent seizures but also is helpful for people with migraines.     Take 100 mg zonisamide at bedtime for 1 week; decrease Keppra to 250 mg twice per day  Then increase to 200 mg zonisamide at bedtime for 1 week. STOP Keppra at this point.   Then increase to 300 mg zonisamide at bedtime thereafter    I am also suspicious that you may have sleep apnea given the night time episodes. I have placed an order for a sleep study.     I have placed a referral for you to see a cardiologist to ensure no cardiac issues are causing these symptoms.     Follow up with me in about 6 weeks

## 2023-10-09 NOTE — LETTER
October 9, 2023      [facility]  [address] [place] [address]  Phone: [phone]  Fax: [phone]       Patient: Laura Aquino   YOB: 1960  Date of Visit: 10/09/2023    To Whom It May Concern:    Suzan Aquino  was at [facility] on 10/09/2023. She is undergoing evaluation for ongoing symptoms of uncertain etiology. She has a brain MRI scheduled on November 1, 2023. A home sleep study to evaluate for obstructive sleep apnea is also pending. She is scheduled to follow up in neurology clinic on 11/28/23, at which time her return to work status will be revisited. If you have any questions or concerns, or if I can be of further assistance, please do not hesitate to contact me.    Sincerely,      Catalina Guzmán, [facility]

## 2023-10-10 ENCOUNTER — TELEPHONE (OUTPATIENT)
Dept: NEUROLOGY | Facility: CLINIC | Age: 63
End: 2023-10-10
Payer: COMMERCIAL

## 2023-10-10 RX ORDER — CALCIUM CARBONATE 300MG(750)
TABLET,CHEWABLE ORAL
COMMUNITY

## 2023-10-10 NOTE — TELEPHONE ENCOUNTER
----- Message from Eri Lombardo MA sent at 10/9/2023  4:51 PM CDT -----    ----- Message -----  From: Catalina Guzmán NP  Sent: 10/9/2023   4:48 PM CDT  To: Arielle SALDANA Staff    Please tell her that I want her to start taking magnesium oxide 400 mg twice per day to see if this helps the headaches. We talked about this last visit, but I forgot to ask her if she has been doing this or not.   Is it possible to do the MRI sooner than 11/1?

## 2023-10-31 DIAGNOSIS — I62.9 INTRACRANIAL HEMORRHAGE: Primary | ICD-10-CM

## 2023-11-01 ENCOUNTER — OFFICE VISIT (OUTPATIENT)
Dept: NEUROSURGERY | Facility: CLINIC | Age: 63
End: 2023-11-01
Payer: COMMERCIAL

## 2023-11-01 ENCOUNTER — HOSPITAL ENCOUNTER (OUTPATIENT)
Dept: RADIOLOGY | Facility: HOSPITAL | Age: 63
Discharge: HOME | End: 2023-11-01
Attending: NEUROLOGICAL SURGERY
Payer: COMMERCIAL

## 2023-11-01 VITALS
WEIGHT: 193 LBS | BODY MASS INDEX: 32.15 KG/M2 | DIASTOLIC BLOOD PRESSURE: 77 MMHG | RESPIRATION RATE: 18 BRPM | SYSTOLIC BLOOD PRESSURE: 124 MMHG | HEART RATE: 78 BPM | HEIGHT: 65 IN

## 2023-11-01 DIAGNOSIS — I62.00 NONTRAUMATIC SUBDURAL HEMORRHAGE, UNSPECIFIED: ICD-10-CM

## 2023-11-01 DIAGNOSIS — I62.9 INTRACRANIAL HEMORRHAGE: ICD-10-CM

## 2023-11-01 DIAGNOSIS — I62.9 INTRACRANIAL HEMORRHAGE: Primary | ICD-10-CM

## 2023-11-01 PROCEDURE — 99999 PR PBB SHADOW E&M-EST. PATIENT-LVL IV: ICD-10-PCS | Mod: PBBFAC,,, | Performed by: NEUROLOGICAL SURGERY

## 2023-11-01 PROCEDURE — 25500020 PHARM REV CODE 255: Mod: PO | Performed by: NEUROLOGICAL SURGERY

## 2023-11-01 PROCEDURE — 3074F SYST BP LT 130 MM HG: CPT | Mod: CPTII,S$GLB,, | Performed by: NEUROLOGICAL SURGERY

## 2023-11-01 PROCEDURE — 99214 PR OFFICE/OUTPT VISIT, EST, LEVL IV, 30-39 MIN: ICD-10-PCS | Mod: S$GLB,,, | Performed by: NEUROLOGICAL SURGERY

## 2023-11-01 PROCEDURE — 3078F DIAST BP <80 MM HG: CPT | Mod: CPTII,S$GLB,, | Performed by: NEUROLOGICAL SURGERY

## 2023-11-01 PROCEDURE — 70553 MRI BRAIN STEM W/O & W/DYE: CPT | Mod: TC,PO

## 2023-11-01 PROCEDURE — 99214 OFFICE O/P EST MOD 30 MIN: CPT | Mod: S$GLB,,, | Performed by: NEUROLOGICAL SURGERY

## 2023-11-01 PROCEDURE — 3074F PR MOST RECENT SYSTOLIC BLOOD PRESSURE < 130 MM HG: ICD-10-PCS | Mod: CPTII,S$GLB,, | Performed by: NEUROLOGICAL SURGERY

## 2023-11-01 PROCEDURE — 70553 MRI BRAIN W WO CONTRAST: ICD-10-PCS | Mod: 26,,, | Performed by: RADIOLOGY

## 2023-11-01 PROCEDURE — 3008F PR BODY MASS INDEX (BMI) DOCUMENTED: ICD-10-PCS | Mod: CPTII,S$GLB,, | Performed by: NEUROLOGICAL SURGERY

## 2023-11-01 PROCEDURE — 1159F PR MEDICATION LIST DOCUMENTED IN MEDICAL RECORD: ICD-10-PCS | Mod: CPTII,S$GLB,, | Performed by: NEUROLOGICAL SURGERY

## 2023-11-01 PROCEDURE — 70553 MRI BRAIN STEM W/O & W/DYE: CPT | Mod: 26,,, | Performed by: RADIOLOGY

## 2023-11-01 PROCEDURE — 3044F PR MOST RECENT HEMOGLOBIN A1C LEVEL <7.0%: ICD-10-PCS | Mod: CPTII,S$GLB,, | Performed by: NEUROLOGICAL SURGERY

## 2023-11-01 PROCEDURE — 3078F PR MOST RECENT DIASTOLIC BLOOD PRESSURE < 80 MM HG: ICD-10-PCS | Mod: CPTII,S$GLB,, | Performed by: NEUROLOGICAL SURGERY

## 2023-11-01 PROCEDURE — 99999 PR PBB SHADOW E&M-EST. PATIENT-LVL IV: CPT | Mod: PBBFAC,,, | Performed by: NEUROLOGICAL SURGERY

## 2023-11-01 PROCEDURE — A9585 GADOBUTROL INJECTION: HCPCS | Mod: PO | Performed by: NEUROLOGICAL SURGERY

## 2023-11-01 PROCEDURE — 1159F MED LIST DOCD IN RCRD: CPT | Mod: CPTII,S$GLB,, | Performed by: NEUROLOGICAL SURGERY

## 2023-11-01 PROCEDURE — 3008F BODY MASS INDEX DOCD: CPT | Mod: CPTII,S$GLB,, | Performed by: NEUROLOGICAL SURGERY

## 2023-11-01 PROCEDURE — 3044F HG A1C LEVEL LT 7.0%: CPT | Mod: CPTII,S$GLB,, | Performed by: NEUROLOGICAL SURGERY

## 2023-11-01 RX ORDER — GADOBUTROL 604.72 MG/ML
8 INJECTION INTRAVENOUS
Status: COMPLETED | OUTPATIENT
Start: 2023-11-01 | End: 2023-11-01

## 2023-11-01 RX ADMIN — GADOBUTROL 8 ML: 604.72 INJECTION INTRAVENOUS at 10:11

## 2023-11-01 NOTE — PROGRESS NOTES
"Neurosurgery History & Physical    Patient ID: Laura Aquino is a 63 y.o. female.    Chief Complaint   Patient presents with    Follow-up     Patient present today as follow up. States of seizures nightly      Interval HPI 11/1/23:  Pt presents for review of updated imaging. Since our last visit she has been seen in the ER for headache and body shakes. MRI was obtained at that time (10/17/23) and     HPI:  Ms. Aquino is a 63 year old female who was in her usual state of health until 8/16 when she had sudden onset of headache that was accompanied by a "pop" sensation.  She reported to [facility] ER where CT showed hyperdensity in the left frontoparietal area.     She was admitted for observation and the lesion was stable.  CTA revealed no underlying lesion.  She was treated supportively and discharged with Neurology follow-up.    Since that time she has had intermittent continued symptoms. She has had some episodes of right upper extremity symptoms as well as waxing and waning symptoms of dizziness, confusion.      She was initially given anti-seizure medication after being disharged in August but only given 30 days and she is no longer taking Keppra.      She had an EEG on 9/22/2023 without evidence of seizure activity.      She has returned to the ER several times with waxing and waning symptoms.      Her most prominent symtpoms daily now are weakness in both legs, stiffness in her neck, and headache.  She also has a vague feeling of unsteadiness/lightheadedness where she feels like "something is about to go on with her whole body."  These vague feelings come on about twice per day.    In late August she had a few minute episode of her right arm shaking that spontaneously resolved and has not happened since.      Of note, on  review of the mediacl record she has had multiple CT scans, CTA, and MRIs of her brain in the past which did not show any definite abnormality.  She apparently was having right face and arm " numbness back in 2019.      Review of Systems   Constitutional:  Negative for activity change and fever.   HENT:  Negative for rhinorrhea, tinnitus, trouble swallowing and voice change.    Eyes:  Negative for visual disturbance.   Respiratory:  Negative for shortness of breath.    Cardiovascular:  Negative for chest pain.   Gastrointestinal:  Negative for nausea and vomiting.   Endocrine: Negative for cold intolerance, heat intolerance, polydipsia, polyphagia and polyuria.   Genitourinary:  Negative for decreased urine volume, frequency and urgency.   Musculoskeletal:  Negative for back pain, neck pain and neck stiffness.   Neurological:  Positive for headaches. Negative for dizziness, tremors, seizures, syncope, facial asymmetry, speech difficulty, weakness, light-headedness and numbness.   Psychiatric/Behavioral:  Negative for confusion.        Past Medical History:   Diagnosis Date    Abdominal wall mass     Abnormal results of liver function studies     AD (atopic dermatitis)     Diabetes mellitus     Diverticulosis     Hepatic steatosis     Hyperlipidemia     Hypertension     Open angle glaucoma suspect with borderline findings at low risk     Precordial pain 10/2019    Skin sensation disturbance     TIA (transient ischemic attack) 2023     Social History     Socioeconomic History    Marital status:    Tobacco Use    Smoking status: Former     Current packs/day: 0.00     Average packs/day: 1 pack/day for 5.0 years (5.0 ttl pk-yrs)     Types: Cigarettes     Start date: 10/1/1988     Quit date: 10/1/1993     Years since quittin.1    Smokeless tobacco: Never   Substance and Sexual Activity    Alcohol use: No     Alcohol/week: 0.0 standard drinks of alcohol    Drug use: No   Social History Narrative    ** Merged History Encounter **          Family History   Problem Relation Age of Onset    Asthma Mother     Diabetes Mother     Cataracts Mother     Anxiety disorder Sister     Anxiety disorder  "Brother     Diabetes Father     Sarcoidosis Daughter     HIV Son     No Known Problems Son     No Known Problems Son     Colon cancer Neg Hx     Colon polyps Neg Hx     Crohn's disease Neg Hx     Ulcerative colitis Neg Hx     Stomach cancer Neg Hx     Esophageal cancer Neg Hx     Glaucoma Neg Hx     Macular degeneration Neg Hx     Retinal detachment Neg Hx      Review of patient's allergies indicates:  No Known Allergies    Current Outpatient Medications:     ALPRAZolam (XANAX) 0.5 MG tablet, Take 1 tablet (0.5 mg total) by mouth daily as needed for Anxiety., Disp: 30 tablet, Rfl: 0    amLODIPine (NORVASC) 5 MG tablet, Take 1 tablet (5 mg total) by mouth once daily., Disp: 90 tablet, Rfl: 1    benzonatate (TESSALON PERLES) 100 MG capsule, Take 1 capsule (100 mg total) by mouth 3 (three) times daily as needed for Cough., Disp: 30 capsule, Rfl: 1    levETIRAcetam (KEPPRA) 750 MG Tab, Take 1 tablet (750 mg total) by mouth 2 (two) times daily., Disp: 60 tablet, Rfl: 11    magnesium oxide 400 mg magnesium Tab, Take by mouth., Disp: , Rfl:     meloxicam (MOBIC) 15 MG tablet, Take 1 tablet (15 mg total) by mouth once daily., Disp: 90 tablet, Rfl: 1    NON FORMULARY MEDICATION, Take 1 packet by mouth Every 3 (three) days. **LYPO-SPHERIC MAGNESIUM L-THREONATE 1000MG**, Disp: , Rfl:     omeprazole (PRILOSEC) 40 MG capsule, Take 40 mg by mouth every morning., Disp: , Rfl:     rosuvastatin (CRESTOR) 40 MG Tab, Take 40 mg by mouth every evening., Disp: , Rfl:     meclizine (ANTIVERT) 25 mg tablet, Take 1 tablet (25 mg total) by mouth every 8 (eight) hours as needed for Dizziness., Disp: 15 tablet, Rfl: 0  Blood pressure 124/77, pulse 78, resp. rate 18, height 5' 5" (1.651 m), weight 87.5 kg (193 lb).      Neurologic Exam     Mental Status   Oriented to person, place, and time.   Attention: normal.   Speech: speech is normal   Level of consciousness: alert  Knowledge: good.     Cranial Nerves     CN III, IV, VI   Extraocular " "motions are normal.     CN VII   Facial expression full, symmetric.     Motor Exam   Muscle bulk: normal  Overall muscle tone: normal    Strength   Strength 5/5 throughout.     Sensory Exam   Light touch normal.     Gait, Coordination, and Reflexes     Gait  Gait: normal    Coordination   Romberg: negative      Physical Exam  Eyes:      Extraocular Movements: EOM normal.   Neurological:      Mental Status: She is oriented to person, place, and time.      Motor: Motor strength is normal.     Coordination: Romberg Test normal.      Gait: Gait is intact.   Psychiatric:         Speech: Speech normal.       Imaging:  MRI brain 10/17/23 and 11/1/23 is personally reviewed and discussed with the patient.  There has been continued interval increase in vasogenic edema on 10/17 MRI which remained stable on 11/1 imaging. Per rad report: " Although a hemorrhagic cavernous angioma or capillary angioma may be considered in the differential, given the increasing vasogenic edema a small hemorrhagic metastatic lesion or a neoplasm not totally excluded."    Assessment/Plan:   Ms. Aquino is a 63-year-old female with sudden onset of headache and hemorrhage in August of 2023.  She has had cyclic episodes of multiple various neurologic symptoms.     Imaging continues to show increasing edema in the region of her prior hemorrhage but the lesion remains stable.  We will continue to follow closely and obtain CT C/A/P to rule out other malignancy. Patient should continue seeing Neurology for further workup to rule out other etiologies for cyclic neurologic dysfunction.  We will see her back after MRI of the brain with and without contrast in 3 months. Will contact with CT results.     "

## 2023-12-06 ENCOUNTER — TELEPHONE (OUTPATIENT)
Dept: PAIN MEDICINE | Facility: CLINIC | Age: 63
End: 2023-12-06
Payer: COMMERCIAL

## 2023-12-06 NOTE — TELEPHONE ENCOUNTER
Called patient in regards to referral placed to PM. No answer. LVM to call [phone] for appointment scheduling

## 2023-12-13 ENCOUNTER — TELEPHONE (OUTPATIENT)
Dept: PAIN MEDICINE | Facility: CLINIC | Age: 63
End: 2023-12-13
Payer: COMMERCIAL

## 2023-12-13 NOTE — TELEPHONE ENCOUNTER
Called patient in regards to referral placed to PM. No answer. LVM to call [phone] for appointment scheduling

## 2023-12-22 ENCOUNTER — TELEPHONE (OUTPATIENT)
Dept: PAIN MEDICINE | Facility: CLINIC | Age: 63
End: 2023-12-22
Payer: COMMERCIAL

## 2023-12-22 NOTE — TELEPHONE ENCOUNTER
Called patient in regards to referral placed to PM. No answer. LVM to call [phone] for appointment scheduling

## 2023-12-28 PROBLEM — Z13.6 ENCOUNTER FOR SCREENING FOR CARDIOVASCULAR DISORDERS: Status: ACTIVE | Noted: 2023-12-28

## 2024-01-30 DIAGNOSIS — I62.9 INTRACRANIAL HEMORRHAGE: Primary | ICD-10-CM

## 2024-02-01 ENCOUNTER — OFFICE VISIT (OUTPATIENT)
Dept: NEUROSURGERY | Facility: CLINIC | Age: 64
End: 2024-02-01
Payer: COMMERCIAL

## 2024-02-01 ENCOUNTER — HOSPITAL ENCOUNTER (OUTPATIENT)
Dept: RADIOLOGY | Facility: HOSPITAL | Age: 64
Discharge: HOME | End: 2024-02-01
Attending: NEUROLOGICAL SURGERY
Payer: COMMERCIAL

## 2024-02-01 VITALS
DIASTOLIC BLOOD PRESSURE: 74 MMHG | BODY MASS INDEX: 32.14 KG/M2 | SYSTOLIC BLOOD PRESSURE: 134 MMHG | RESPIRATION RATE: 18 BRPM | HEIGHT: 65 IN | HEART RATE: 84 BPM | WEIGHT: 192.88 LBS

## 2024-02-01 DIAGNOSIS — I62.9 INTRACRANIAL HEMORRHAGE: ICD-10-CM

## 2024-02-01 DIAGNOSIS — D18.02 CAVERNOUS HEMANGIOMA OF BRAIN: Primary | ICD-10-CM

## 2024-02-01 PROCEDURE — 3078F DIAST BP <80 MM HG: CPT | Mod: CPTII,S$GLB,, | Performed by: NEUROLOGICAL SURGERY

## 2024-02-01 PROCEDURE — 3008F BODY MASS INDEX DOCD: CPT | Mod: CPTII,S$GLB,, | Performed by: NEUROLOGICAL SURGERY

## 2024-02-01 PROCEDURE — 1159F MED LIST DOCD IN RCRD: CPT | Mod: CPTII,S$GLB,, | Performed by: NEUROLOGICAL SURGERY

## 2024-02-01 PROCEDURE — A9585 GADOBUTROL INJECTION: HCPCS | Mod: PO | Performed by: NEUROLOGICAL SURGERY

## 2024-02-01 PROCEDURE — 25500020 PHARM REV CODE 255: Mod: PO | Performed by: NEUROLOGICAL SURGERY

## 2024-02-01 PROCEDURE — 70553 MRI BRAIN STEM W/O & W/DYE: CPT | Mod: TC,PO

## 2024-02-01 PROCEDURE — 70553 MRI BRAIN STEM W/O & W/DYE: CPT | Mod: 26,,, | Performed by: RADIOLOGY

## 2024-02-01 PROCEDURE — 3075F SYST BP GE 130 - 139MM HG: CPT | Mod: CPTII,S$GLB,, | Performed by: NEUROLOGICAL SURGERY

## 2024-02-01 PROCEDURE — 99215 OFFICE O/P EST HI 40 MIN: CPT | Mod: S$GLB,,, | Performed by: NEUROLOGICAL SURGERY

## 2024-02-01 RX ORDER — GADOBUTROL 604.72 MG/ML
8 INJECTION INTRAVENOUS
Status: COMPLETED | OUTPATIENT
Start: 2024-02-01 | End: 2024-02-01

## 2024-02-01 RX ADMIN — GADOBUTROL 8 ML: 604.72 INJECTION INTRAVENOUS at 11:02

## 2024-02-01 NOTE — PROGRESS NOTES
"Neurosurgery History & Physical    Patient ID: Laura Aquino is a 63 y.o. female.    Chief Complaint   Patient presents with    Follow-up     3 month f/u with MRI.     Interval HIP 2/1/2024:  Since last visit she has been doing very well without further episodes of body shaking.    Interval HPI 11/1/23:  Pt presents for review of updated imaging. Since our last visit she has been seen in the ER for headache and body shakes. MRI was obtained at that time (10/17/23) and      HPI:  Ms. Aquino is a 63 year old female who was in her usual state of health until 8/16 when she had sudden onset of headache that was accompanied by a "pop" sensation.  She reported to [facility] ER where CT showed hyperdensity in the left frontoparietal area.      She was admitted for observation and the lesion was stable.  CTA revealed no underlying lesion.  She was treated supportively and discharged with Neurology follow-up.     Since that time she has had intermittent continued symptoms. She has had some episodes of right upper extremity symptoms as well as waxing and waning symptoms of dizziness, confusion.       She was initially given anti-seizure medication after being disharged in August but only given 30 days and she is no longer taking Keppra.       She had an EEG on 9/22/2023 without evidence of seizure activity.       She has returned to the ER several times with waxing and waning symptoms.       Her most prominent symtpoms daily now are weakness in both legs, stiffness in her neck, and headache.  She also has a vague feeling of unsteadiness/lightheadedness where she feels like "something is about to go on with her whole body."  These vague feelings come on about twice per day.     In late August she had a few minute episode of her right arm shaking that spontaneously resolved and has not happened since.       Of note, on  review of the mediacl record she has had multiple CT scans, CTA, and MRIs of her brain in the past which did not " show any definite abnormality.  She apparently was having right face and arm numbness back in 2019.      Review of Systems  Denies fevers chills nausea or vomiting    Past Medical History:   Diagnosis Date    Abdominal wall mass     Abnormal results of liver function studies     AD (atopic dermatitis)     Diabetes mellitus     Diverticulosis     Hepatic steatosis     Hyperlipidemia     Hypertension     Open angle glaucoma suspect with borderline findings at low risk     Precordial pain 10/2019    Skin sensation disturbance     TIA (transient ischemic attack) 2023     Social History     Socioeconomic History    Marital status:    Tobacco Use    Smoking status: Former     Current packs/day: 0.00     Average packs/day: 1 pack/day for 5.0 years (5.0 ttl pk-yrs)     Types: Cigarettes     Start date: 10/1/1988     Quit date: 10/1/1993     Years since quittin.3    Smokeless tobacco: Never   Substance and Sexual Activity    Alcohol use: No     Alcohol/week: 0.0 standard drinks of alcohol    Drug use: No   Social History Narrative    ** Merged History Encounter **          Family History   Problem Relation Age of Onset    Asthma Mother     Diabetes Mother     Cataracts Mother     Anxiety disorder Sister     Anxiety disorder Brother     Diabetes Father     Sarcoidosis Daughter     HIV Son     No Known Problems Son     No Known Problems Son     Colon cancer Neg Hx     Colon polyps Neg Hx     Crohn's disease Neg Hx     Ulcerative colitis Neg Hx     Stomach cancer Neg Hx     Esophageal cancer Neg Hx     Glaucoma Neg Hx     Macular degeneration Neg Hx     Retinal detachment Neg Hx      Review of patient's allergies indicates:  No Known Allergies    Current Outpatient Medications:     amLODIPine (NORVASC) 5 MG tablet, Take 1 tablet (5 mg total) by mouth once daily., Disp: 90 tablet, Rfl: 1    dicyclomine (BENTYL) 10 MG capsule, Take 10 mg by mouth 4 (four) times daily., Disp: , Rfl:     divalproex (DEPAKOTE) 500  "MG TbEC, Take 250 mg by mouth every 8 (eight) hours., Disp: , Rfl:     ergocalciferol, vitamin D2, (VITAMIN D2 ORAL), Take by mouth., Disp: , Rfl:     magnesium oxide 400 mg magnesium Tab, Take by mouth., Disp: , Rfl:     metoprolol tartrate (LOPRESSOR) 100 MG tablet, If heart rate >75 bpm take metoprolol OR ivabradine: take 100 mg tablet 1 hour prior to CTA.  Bring second tab with you to the procedure., Disp: 2 tablet, Rfl: 0    metoprolol tartrate (LOPRESSOR) 50 MG tablet, If heart rate >60 bpm:  take 50 mg tablet 1 hour prior to CTA.  Bring second tab with you to the procedure., Disp: 2 tablet, Rfl: 0    NON FORMULARY MEDICATION, Take 1 packet by mouth Every 3 (three) days. **LYPO-SPHERIC MAGNESIUM L-THREONATE 1000MG**, Disp: , Rfl:     pantoprazole (PROTONIX) 40 MG tablet, Take 1 tablet (40 mg total) by mouth once daily., Disp: 90 tablet, Rfl: 3    rosuvastatin (CRESTOR) 40 MG Tab, Take 1 tablet (40 mg total) by mouth every evening., Disp: 90 tablet, Rfl: 1    ALPRAZolam (XANAX) 0.5 MG tablet, Take 1 tablet (0.5 mg total) by mouth daily as needed for Anxiety. (Patient not taking: Reported on 1/11/2024), Disp: 30 tablet, Rfl: 0    benzonatate (TESSALON PERLES) 100 MG capsule, Take 1 capsule (100 mg total) by mouth 3 (three) times daily as needed for Cough. (Patient not taking: Reported on 1/11/2024), Disp: 30 capsule, Rfl: 1    levETIRAcetam (KEPPRA) 750 MG Tab, Take 1 tablet (750 mg total) by mouth 2 (two) times daily. (Patient not taking: Reported on 2/1/2024), Disp: 60 tablet, Rfl: 11    meclizine (ANTIVERT) 25 mg tablet, Take 1 tablet (25 mg total) by mouth every 8 (eight) hours as needed for Dizziness., Disp: 15 tablet, Rfl: 0    meloxicam (MOBIC) 15 MG tablet, Take 1 tablet (15 mg total) by mouth once daily. (Patient not taking: Reported on 1/11/2024), Disp: 90 tablet, Rfl: 1  No current facility-administered medications for this visit.  Blood pressure 134/74, pulse 84, resp. rate 18, height 5' 5" (1.651 " m), weight 87.5 kg (192 lb 14.4 oz).      Neurologic Exam  AAOx4, NAD  Strength   Deltoids Triceps Biceps Wrist Extension Wrist Flexion Hand    Upper: R 5/5 5/5 5/5 5/5 5/5 5/5     L 5/5 5/5 5/5 5/5 5/5 5/5       Iliopsoas Quadriceps Knee  Flexion Tibialis  anterior Gastro- cnemius EHL   Lower: R 5/5 5/5 5/5 5/5 5/5 5/5     L 5/5 5/5 5/5 5/5 5/5 5/5      Sensation grossly intact to light touch in bilateral upper and lower extremities    Imaging:  MRI of the brain with and without contrast dated 02/01/2024 is personally reviewed and discussed with the patient.  In the interval there has been marked improvement in the FLAIR hyperintensity within the left parietal lobe.  No significant enhancement is noted.  There again noted is an area hypointensity on GRE imaging measuring approximately 6 mm which is most consistent with a cavernous malformation.    Assessment/Plan:   Ms. Aquino is a 63-year-old female with likely cavernous malformation in left parietal area which has improved in imaging characteristics since initial worsening in August of 2023.    I discussed natural history of cavernous malformation.  I discussed indications for resection including repeat symptoms attributable to the lesion, repeat seizures not controllable by medical means, or repeat hemorrhaging.    We will plan to follow up in 6 months with a repeat MRI of the brain with and without contrast.

## 2024-02-09 PROBLEM — R07.89 ATYPICAL CHEST PAIN: Status: ACTIVE | Noted: 2024-02-09

## 2024-02-09 PROBLEM — R94.39 ABNORMAL STRESS TEST: Status: ACTIVE | Noted: 2024-02-09

## 2024-03-20 PROBLEM — I69.359 HISTORY OF HEMORRHAGIC STROKE WITH RESIDUAL HEMIPARESIS: Status: ACTIVE | Noted: 2023-08-01

## 2024-03-20 PROBLEM — H65.03 NON-RECURRENT ACUTE SEROUS OTITIS MEDIA OF BOTH EARS: Status: ACTIVE | Noted: 2024-03-20

## 2024-03-20 PROBLEM — E11.9 DIABETES MELLITUS: Status: ACTIVE | Noted: 2024-03-20

## 2024-03-20 PROBLEM — M72.0 DUPUYTREN CONTRACTURE: Status: ACTIVE | Noted: 2024-03-20

## 2024-04-01 PROBLEM — Z13.6 ENCOUNTER FOR SCREENING FOR CARDIOVASCULAR DISORDERS: Status: RESOLVED | Noted: 2023-12-28 | Resolved: 2024-04-01

## 2024-04-09 PROBLEM — R51.9 INTRACTABLE HEADACHE: Status: ACTIVE | Noted: 2024-04-09

## 2024-04-09 PROBLEM — D18.00 CAVERNOUS ANGIOMA: Status: ACTIVE | Noted: 2023-08-17

## 2024-04-09 PROBLEM — M54.81 CERVICO-OCCIPITAL NEURALGIA OF RIGHT SIDE: Status: ACTIVE | Noted: 2024-04-09

## 2024-04-15 ENCOUNTER — OFFICE VISIT (OUTPATIENT)
Dept: ORTHOPEDICS | Facility: CLINIC | Age: 64
End: 2024-04-15
Payer: COMMERCIAL

## 2024-04-15 ENCOUNTER — HOSPITAL ENCOUNTER (OUTPATIENT)
Dept: RADIOLOGY | Facility: HOSPITAL | Age: 64
Discharge: HOME | End: 2024-04-15
Attending: ORTHOPAEDIC SURGERY
Payer: COMMERCIAL

## 2024-04-15 VITALS — BODY MASS INDEX: 34.49 KG/M2 | HEIGHT: 65 IN | WEIGHT: 207 LBS

## 2024-04-15 DIAGNOSIS — M67.912 TENDINOPATHY OF LEFT ROTATOR CUFF: Primary | ICD-10-CM

## 2024-04-15 DIAGNOSIS — M67.912 TENDINOPATHY OF LEFT ROTATOR CUFF: ICD-10-CM

## 2024-04-15 DIAGNOSIS — M72.0 DUPUYTREN CONTRACTURE: ICD-10-CM

## 2024-04-15 PROCEDURE — 20610 DRAIN/INJ JOINT/BURSA W/O US: CPT | Mod: LT,S$GLB,, | Performed by: ORTHOPAEDIC SURGERY

## 2024-04-15 PROCEDURE — 99999 PR PBB SHADOW E&M-EST. PATIENT-LVL IV: CPT | Mod: PBBFAC,,, | Performed by: ORTHOPAEDIC SURGERY

## 2024-04-15 PROCEDURE — 99213 OFFICE O/P EST LOW 20 MIN: CPT | Mod: 25,S$GLB,, | Performed by: ORTHOPAEDIC SURGERY

## 2024-04-15 PROCEDURE — 73030 X-RAY EXAM OF SHOULDER: CPT | Mod: 26,LT,, | Performed by: STUDENT IN AN ORGANIZED HEALTH CARE EDUCATION/TRAINING PROGRAM

## 2024-04-15 PROCEDURE — 3008F BODY MASS INDEX DOCD: CPT | Mod: CPTII,S$GLB,, | Performed by: ORTHOPAEDIC SURGERY

## 2024-04-15 PROCEDURE — 1159F MED LIST DOCD IN RCRD: CPT | Mod: CPTII,S$GLB,, | Performed by: ORTHOPAEDIC SURGERY

## 2024-04-15 PROCEDURE — 73030 X-RAY EXAM OF SHOULDER: CPT | Mod: TC,PO,LT

## 2024-04-15 PROCEDURE — 3044F HG A1C LEVEL LT 7.0%: CPT | Mod: CPTII,S$GLB,, | Performed by: ORTHOPAEDIC SURGERY

## 2024-04-15 RX ORDER — LIDOCAINE HYDROCHLORIDE 10 MG/ML
5 INJECTION, SOLUTION EPIDURAL; INFILTRATION; INTRACAUDAL; PERINEURAL
Status: DISCONTINUED | OUTPATIENT
Start: 2024-04-15 | End: 2024-04-15 | Stop reason: HOSPADM

## 2024-04-15 RX ORDER — TRIAMCINOLONE ACETONIDE 40 MG/ML
40 INJECTION, SUSPENSION INTRA-ARTICULAR; INTRAMUSCULAR
Status: DISCONTINUED | OUTPATIENT
Start: 2024-04-15 | End: 2024-04-15 | Stop reason: HOSPADM

## 2024-04-15 RX ADMIN — TRIAMCINOLONE ACETONIDE 40 MG: 40 INJECTION, SUSPENSION INTRA-ARTICULAR; INTRAMUSCULAR at 01:04

## 2024-04-15 RX ADMIN — LIDOCAINE HYDROCHLORIDE 5 ML: 10 INJECTION, SOLUTION EPIDURAL; INFILTRATION; INTRACAUDAL; PERINEURAL at 01:04

## 2024-04-15 NOTE — PROCEDURES
Large Joint Aspiration/Injection: L subacromial bursa    Date/Time: 4/15/2024 1:20 PM    Performed by: Ramiro Fontaine MD  Authorized by: Ramiro Fontaine MD    Consent Done?:  Yes (Verbal)  Indications:  Pain  Timeout: prior to procedure the correct patient, procedure, and site was verified    Prep: patient was prepped and draped in usual sterile fashion      Details:  Needle Size:  21 G  Approach:  Posterior  Location:  Shoulder  Site:  L subacromial bursa  Medications:  5 mL LIDOcaine (PF) 10 mg/ml (1%) 10 mg/mL (1 %); 40 mg triamcinolone acetonide 40 mg/mL  Patient tolerance:  Patient tolerated the procedure well with no immediate complications

## 2024-04-15 NOTE — PROGRESS NOTES
Ms Aquino returns to clinic today.  She has multiple complaints.  I most significant complaint is of her left shoulder.  She states that over the last couple of weeks she has had pain specifically with lifting.  She also states that certain positions when she sleeping causes her pain.  She also has some nodules in the palms of the hands which he would like me to look at as well     Physical exam:  Examination of the left shoulder reveals that there are no skin changes.  Palpation does not produce significant tenderness.  Active range of motion is elevation to 140° external rotation 70° and internal rotation is to T12.  She has 4/5 strength of the supraspinatus with infraspinatus and subscapularis being 5/5.  She does have pain with impingement signs 1 and 2.  He does have a 2+ radial pulse     Examination of bilateral hands reveals that she does have mild Dupuytren's disease.  There are several nodules and cords but there was no contracture of any of the fingers     Radiology: X-rays of the left shoulder were taken in clinic today are no fractures dislocations or other significant pathology     Assessment: Bilateral hand Dupuytren's contracture, left shoulder rotator cuff tendinopathy     Plan:    1.  After informed consent was obtained injection was placed to the left shoulder subacromial space.  The patient tolerated that well.      2. We will continue to follow the hands for Dupuytren's disease    3.  She will follow up with me as needed

## 2024-04-17 ENCOUNTER — TELEPHONE (OUTPATIENT)
Dept: NEUROSURGERY | Facility: CLINIC | Age: 64
End: 2024-04-17
Payer: COMMERCIAL

## 2024-04-17 NOTE — TELEPHONE ENCOUNTER
S/W patient. Successfully scheduled with Dr. Veras for a hosp f/u on Tuesday, 05/14/24 @ 9:00 am. Advised to contact clinic with any questions/concerns.

## 2024-05-14 ENCOUNTER — OFFICE VISIT (OUTPATIENT)
Dept: NEUROSURGERY | Facility: CLINIC | Age: 64
End: 2024-05-14
Payer: COMMERCIAL

## 2024-05-14 VITALS
SYSTOLIC BLOOD PRESSURE: 140 MMHG | RESPIRATION RATE: 18 BRPM | HEART RATE: 73 BPM | HEIGHT: 65 IN | WEIGHT: 207 LBS | BODY MASS INDEX: 34.49 KG/M2 | DIASTOLIC BLOOD PRESSURE: 87 MMHG

## 2024-05-14 DIAGNOSIS — I69.359 HISTORY OF HEMORRHAGIC STROKE WITH RESIDUAL HEMIPARESIS: ICD-10-CM

## 2024-05-14 DIAGNOSIS — R20.2 NUMBNESS AND TINGLING: ICD-10-CM

## 2024-05-14 DIAGNOSIS — R20.0 NUMBNESS AND TINGLING: ICD-10-CM

## 2024-05-14 PROCEDURE — 1159F MED LIST DOCD IN RCRD: CPT | Mod: CPTII,S$GLB,, | Performed by: NEUROLOGICAL SURGERY

## 2024-05-14 PROCEDURE — 3077F SYST BP >= 140 MM HG: CPT | Mod: CPTII,S$GLB,, | Performed by: NEUROLOGICAL SURGERY

## 2024-05-14 PROCEDURE — 3008F BODY MASS INDEX DOCD: CPT | Mod: CPTII,S$GLB,, | Performed by: NEUROLOGICAL SURGERY

## 2024-05-14 PROCEDURE — 3079F DIAST BP 80-89 MM HG: CPT | Mod: CPTII,S$GLB,, | Performed by: NEUROLOGICAL SURGERY

## 2024-05-14 PROCEDURE — 99215 OFFICE O/P EST HI 40 MIN: CPT | Mod: S$GLB,,, | Performed by: NEUROLOGICAL SURGERY

## 2024-05-14 PROCEDURE — 3044F HG A1C LEVEL LT 7.0%: CPT | Mod: CPTII,S$GLB,, | Performed by: NEUROLOGICAL SURGERY

## 2024-05-20 ENCOUNTER — TELEPHONE (OUTPATIENT)
Dept: NEUROSURGERY | Facility: CLINIC | Age: 64
End: 2024-05-20
Payer: COMMERCIAL

## 2024-05-27 ENCOUNTER — OFFICE VISIT (OUTPATIENT)
Dept: ORTHOPEDICS | Facility: CLINIC | Age: 64
End: 2024-05-27
Payer: COMMERCIAL

## 2024-05-27 VITALS — BODY MASS INDEX: 34.49 KG/M2 | WEIGHT: 207 LBS | HEIGHT: 65 IN

## 2024-05-27 DIAGNOSIS — S29.012A RHOMBOID MUSCLE STRAIN, INITIAL ENCOUNTER: Primary | ICD-10-CM

## 2024-05-27 PROCEDURE — 99213 OFFICE O/P EST LOW 20 MIN: CPT | Mod: S$GLB,,, | Performed by: ORTHOPAEDIC SURGERY

## 2024-05-27 PROCEDURE — 3044F HG A1C LEVEL LT 7.0%: CPT | Mod: CPTII,S$GLB,, | Performed by: ORTHOPAEDIC SURGERY

## 2024-05-27 PROCEDURE — 1159F MED LIST DOCD IN RCRD: CPT | Mod: CPTII,S$GLB,, | Performed by: ORTHOPAEDIC SURGERY

## 2024-05-27 PROCEDURE — 99999 PR PBB SHADOW E&M-EST. PATIENT-LVL III: CPT | Mod: PBBFAC,,, | Performed by: ORTHOPAEDIC SURGERY

## 2024-05-27 PROCEDURE — 3008F BODY MASS INDEX DOCD: CPT | Mod: CPTII,S$GLB,, | Performed by: ORTHOPAEDIC SURGERY

## 2024-05-27 RX ORDER — MELOXICAM 15 MG/1
15 TABLET ORAL DAILY
Qty: 21 TABLET | Refills: 0 | Status: SHIPPED | OUTPATIENT
Start: 2024-05-27 | End: 2024-06-17

## 2024-05-27 NOTE — PROGRESS NOTES
Ms Aquino returns to clinic today.  Has a history of left shoulder subacromial injection.  She did very well.  She was complaining about pain to the right shoulder over her shoulder blade.  States that activity does worsen her pain.      Physical exam:  Examination of the right shoulder reveals that there is no edema.  Palpation produces tenderness directly over the rhomboids.  There is no tenderness about the shoulder itself.  Range of motion actively is elevation to 130° external rotation is 70° and internal rotation is to L5.  She does have 5/5 strength in the rotator cuff musculature.  There is no scapular winging noted.    Assessment: Right shoulder rhomboid muscle strain     Plan:     1. I will start her on Mobic 15 mg daily    2.  She can continue activities as tolerated     3.  If this does not improve her symptoms I may consider either steroid injection or course of therapy